# Patient Record
Sex: MALE | Race: WHITE | NOT HISPANIC OR LATINO | Employment: UNEMPLOYED | URBAN - METROPOLITAN AREA
[De-identification: names, ages, dates, MRNs, and addresses within clinical notes are randomized per-mention and may not be internally consistent; named-entity substitution may affect disease eponyms.]

---

## 2020-09-22 ENCOUNTER — OFFICE VISIT (OUTPATIENT)
Dept: FAMILY MEDICINE CLINIC | Facility: CLINIC | Age: 60
End: 2020-09-22

## 2020-09-22 DIAGNOSIS — Z12.11 SCREEN FOR COLON CANCER: Primary | ICD-10-CM

## 2020-09-22 DIAGNOSIS — E55.9 VITAMIN D DEFICIENCY: ICD-10-CM

## 2020-09-22 DIAGNOSIS — E78.2 HYPERLIPIDEMIA, MIXED: ICD-10-CM

## 2020-09-22 DIAGNOSIS — Z12.5 SCREENING PSA (PROSTATE SPECIFIC ANTIGEN): ICD-10-CM

## 2020-09-22 DIAGNOSIS — J30.9 CHRONIC ALLERGIC RHINITIS: ICD-10-CM

## 2020-09-22 DIAGNOSIS — Z72.0 TOBACCO ABUSE: ICD-10-CM

## 2020-09-22 PROCEDURE — 90732 PPSV23 VACC 2 YRS+ SUBQ/IM: CPT | Performed by: PHYSICIAN ASSISTANT

## 2020-09-22 PROCEDURE — 90471 IMMUNIZATION ADMIN: CPT | Performed by: PHYSICIAN ASSISTANT

## 2020-09-22 PROCEDURE — 90715 TDAP VACCINE 7 YRS/> IM: CPT | Performed by: PHYSICIAN ASSISTANT

## 2020-09-22 PROCEDURE — 99203 OFFICE O/P NEW LOW 30 MIN: CPT | Performed by: PHYSICIAN ASSISTANT

## 2020-09-22 PROCEDURE — 90472 IMMUNIZATION ADMIN EACH ADD: CPT | Performed by: PHYSICIAN ASSISTANT

## 2020-09-22 RX ORDER — FLUNISOLIDE 0.25 MG/ML
2 SOLUTION NASAL EVERY 12 HOURS
COMMUNITY

## 2020-09-22 NOTE — PROGRESS NOTES
Immunization  Immunization performed in RD by Hayes House MA. Patient tolerated the procedure well without complications.  09/22/20   Hayes House MA

## 2020-09-22 NOTE — PROGRESS NOTES
Immunization  Immunization performed in LD by Hayes House MA. Patient tolerated the procedure well without complications.  09/22/20   Hayes House MA

## 2020-09-22 NOTE — PROGRESS NOTES
Subjective   Isiah Saldana is a 60 y.o. male.     History of Present Illness   Isiah Saldana 60 y.o. male who presents today for a new patient appointment.    he has a history of There is no problem list on file for this patient.  .  he is here to establish care I reviewed the PFSH recorded today by my MA/LPN staff.   he   He has been feeling well.  He has had no medical care in the last several years.  He has been to the dentist and is edentulous.  Only wears a upper denture.   Had boil in groin a few years ago and did require incision and drainage and antibiotic therapy and did resolve  He has no other chronic conditions and he feels well every day.  Denies sleep apnea or observed apnea.  Due to smoking will update vaccines to include Tdap and Pneumovax 23.  I do also suggest flu shot in the next week or 2    need low dose CT lung; 36 pk yr hx---he did decide to get this.   Labs  Get weight down  Need colonoscopy screen'  Does get PND  Some OA knees  Need f/u on Vit D and lipids    occas pain left knee  occas trace pedal edema  The following portions of the patient's history were reviewed and updated as appropriate: allergies, current medications, past family history, past medical history, past social history, past surgical history and problem list.    Review of Systems   Constitutional: Negative for activity change, appetite change and unexpected weight change.   HENT: Negative for nosebleeds and trouble swallowing.    Eyes: Negative for pain and visual disturbance.   Respiratory: Negative for chest tightness, shortness of breath and wheezing.    Cardiovascular: Negative for chest pain and palpitations.   Gastrointestinal: Negative for abdominal pain and blood in stool.   Endocrine: Negative.    Genitourinary: Negative for difficulty urinating and hematuria.   Musculoskeletal: Positive for arthralgias. Negative for joint swelling.   Skin: Negative for color change and rash.   Allergic/Immunologic: Negative.     Neurological: Negative for syncope and speech difficulty.   Hematological: Negative for adenopathy.   Psychiatric/Behavioral: Negative for confusion.   All other systems reviewed and are negative.      Objective   Physical Exam  Vitals signs and nursing note reviewed.   Constitutional:       General: He is not in acute distress.     Appearance: He is well-developed. He is obese. He is not diaphoretic.   HENT:      Head: Normocephalic and atraumatic.      Comments: -fluid bilateral     Right Ear: Ear canal and external ear normal.      Left Ear: Ear canal and external ear normal.      Nose: Nose normal.      Mouth/Throat:      Comments: Upper denture, edentulous  Eyes:      General: No scleral icterus.        Right eye: No discharge.         Left eye: No discharge.      Conjunctiva/sclera: Conjunctivae normal.      Pupils: Pupils are equal, round, and reactive to light.   Neck:      Musculoskeletal: Normal range of motion and neck supple.      Thyroid: No thyromegaly.      Vascular: Carotid bruit present.      Trachea: No tracheal deviation.   Cardiovascular:      Rate and Rhythm: Normal rate and regular rhythm.      Pulses: Normal pulses.      Heart sounds: Normal heart sounds. No murmur. No gallop.    Pulmonary:      Effort: Pulmonary effort is normal. No respiratory distress.      Breath sounds: Normal breath sounds. No wheezing or rales.   Abdominal:      General: Bowel sounds are normal.      Palpations: There is no mass.      Tenderness: There is no abdominal tenderness.   Musculoskeletal: Normal range of motion.         General: No deformity.      Right lower leg: Edema present.      Left lower leg: Edema present.      Comments: Trace pedal edema = bilat   Lymphadenopathy:      Cervical: No cervical adenopathy.   Skin:     General: Skin is warm.      Capillary Refill: Capillary refill takes less than 2 seconds.      Coloration: Skin is not pale.      Findings: No bruising, erythema or rash.      Comments:  Calluses on knees   Neurological:      General: No focal deficit present.      Mental Status: He is alert and oriented to person, place, and time.      Motor: No abnormal muscle tone.      Coordination: Coordination normal.   Psychiatric:         Mood and Affect: Mood normal.         Behavior: Behavior normal.         Thought Content: Thought content normal.         Judgment: Judgment normal.         Assessment/Plan   Isiah was seen today for establish care.    Diagnoses and all orders for this visit:    Screen for colon cancer  -     Comprehensive metabolic panel  -     Lipid panel  -     CBC and Differential  -     TSH  -     T3, Free  -     T4, Free  -     Vitamin D 25 Hydroxy  -     Vitamin B12  -     Folate  -     PSA Screen  -     Urinalysis With Microscopic - Urine, Clean Catch  -     Ambulatory Referral to Gastroenterology    Tobacco abuse  -     Comprehensive metabolic panel  -     Lipid panel  -     CBC and Differential  -     TSH  -     T3, Free  -     T4, Free  -     Vitamin D 25 Hydroxy  -     Vitamin B12  -     Folate  -     PSA Screen  -     Urinalysis With Microscopic - Urine, Clean Catch    Screening PSA (prostate specific antigen)  -     Comprehensive metabolic panel  -     Lipid panel  -     CBC and Differential  -     TSH  -     T3, Free  -     T4, Free  -     Vitamin D 25 Hydroxy  -     Vitamin B12  -     Folate  -     PSA Screen  -     Urinalysis With Microscopic - Urine, Clean Catch    Chronic allergic rhinitis  -     Comprehensive metabolic panel  -     Lipid panel  -     CBC and Differential  -     TSH  -     T3, Free  -     T4, Free  -     Vitamin D 25 Hydroxy  -     Vitamin B12  -     Folate  -     PSA Screen  -     Urinalysis With Microscopic - Urine, Clean Catch    Vitamin D deficiency  -     Comprehensive metabolic panel  -     Lipid panel  -     CBC and Differential  -     TSH  -     T3, Free  -     T4, Free  -     Vitamin D 25 Hydroxy  -     Vitamin B12  -     Folate  -     PSA  Screen  -     Urinalysis With Microscopic - Urine, Clean Catch    Hyperlipidemia, mixed  -     Comprehensive metabolic panel  -     Lipid panel  -     CBC and Differential  -     TSH  -     T3, Free  -     T4, Free  -     Vitamin D 25 Hydroxy  -     Vitamin B12  -     Folate  -     PSA Screen  -     Urinalysis With Microscopic - Urine, Clean Catch    Other orders  -     Pneumococcal Polysaccharide Vaccine 23-Valent Greater Than or Equal To 3yo Subcutaneous / IM  -     Tdap Vaccine Greater Than or Equal To 6yo IM      Low-dose CT for lung cancer screening due to 36-pack-year history  I will update Tdap, pneumovax 23; suggest flu shot next few weeks  Get labs  Stop smoking  Colon cancer screen

## 2020-09-29 ENCOUNTER — HOSPITAL ENCOUNTER (OUTPATIENT)
Dept: PET IMAGING | Facility: HOSPITAL | Age: 60
Discharge: HOME | End: 2020-09-29
Admitting: PHYSICIAN ASSISTANT

## 2020-09-29 DIAGNOSIS — Z72.0 TOBACCO ABUSE: ICD-10-CM

## 2020-09-29 PROCEDURE — G0297 LDCT FOR LUNG CA SCREEN: HCPCS

## 2020-09-30 LAB
25(OH)D3+25(OH)D2 SERPL-MCNC: 26.7 NG/ML (ref 30–100)
ALBUMIN SERPL-MCNC: 4.3 G/DL (ref 3.5–5.2)
ALBUMIN/GLOB SERPL: 1.7 G/DL
ALP SERPL-CCNC: 127 U/L (ref 39–117)
ALT SERPL-CCNC: 20 U/L (ref 1–41)
APPEARANCE UR: CLEAR
AST SERPL-CCNC: 19 U/L (ref 1–40)
BACTERIA #/AREA URNS HPF: NORMAL /HPF
BASOPHILS # BLD AUTO: 0.05 10*3/MM3 (ref 0–0.2)
BASOPHILS NFR BLD AUTO: 0.5 % (ref 0–1.5)
BILIRUB SERPL-MCNC: 0.3 MG/DL (ref 0–1.2)
BILIRUB UR QL STRIP: NEGATIVE
BUN SERPL-MCNC: 11 MG/DL (ref 6–20)
BUN/CREAT SERPL: 12.9 (ref 7–25)
CALCIUM SERPL-MCNC: 9.4 MG/DL (ref 8.6–10.5)
CASTS URNS MICRO: NORMAL
CHLORIDE SERPL-SCNC: 100 MMOL/L (ref 98–107)
CHOLEST SERPL-MCNC: 178 MG/DL (ref 0–200)
CO2 SERPL-SCNC: 28.2 MMOL/L (ref 22–29)
COLOR UR: YELLOW
CREAT SERPL-MCNC: 0.85 MG/DL (ref 0.76–1.27)
EOSINOPHIL # BLD AUTO: 0.2 10*3/MM3 (ref 0–0.4)
EOSINOPHIL NFR BLD AUTO: 2.1 % (ref 0.3–6.2)
EPI CELLS #/AREA URNS HPF: NORMAL /HPF
ERYTHROCYTE [DISTWIDTH] IN BLOOD BY AUTOMATED COUNT: 12.5 % (ref 12.3–15.4)
FOLATE SERPL-MCNC: 7.28 NG/ML (ref 4.78–24.2)
GLOBULIN SER CALC-MCNC: 2.5 GM/DL
GLUCOSE SERPL-MCNC: 154 MG/DL (ref 65–99)
GLUCOSE UR QL: NEGATIVE
HCT VFR BLD AUTO: 45 % (ref 37.5–51)
HDLC SERPL-MCNC: 32 MG/DL (ref 40–60)
HGB BLD-MCNC: 15.6 G/DL (ref 13–17.7)
HGB UR QL STRIP: NEGATIVE
IMM GRANULOCYTES # BLD AUTO: 0.04 10*3/MM3 (ref 0–0.05)
IMM GRANULOCYTES NFR BLD AUTO: 0.4 % (ref 0–0.5)
KETONES UR QL STRIP: NEGATIVE
LDLC SERPL CALC-MCNC: 117 MG/DL (ref 0–100)
LEUKOCYTE ESTERASE UR QL STRIP: NEGATIVE
LYMPHOCYTES # BLD AUTO: 3.97 10*3/MM3 (ref 0.7–3.1)
LYMPHOCYTES NFR BLD AUTO: 41.1 % (ref 19.6–45.3)
MCH RBC QN AUTO: 30.8 PG (ref 26.6–33)
MCHC RBC AUTO-ENTMCNC: 34.7 G/DL (ref 31.5–35.7)
MCV RBC AUTO: 88.9 FL (ref 79–97)
MONOCYTES # BLD AUTO: 0.56 10*3/MM3 (ref 0.1–0.9)
MONOCYTES NFR BLD AUTO: 5.8 % (ref 5–12)
NEUTROPHILS # BLD AUTO: 4.83 10*3/MM3 (ref 1.7–7)
NEUTROPHILS NFR BLD AUTO: 50.1 % (ref 42.7–76)
NITRITE UR QL STRIP: NEGATIVE
NRBC BLD AUTO-RTO: 0 /100 WBC (ref 0–0.2)
PH UR STRIP: 6.5 [PH] (ref 5–8)
PLATELET # BLD AUTO: 291 10*3/MM3 (ref 140–450)
POTASSIUM SERPL-SCNC: 4.6 MMOL/L (ref 3.5–5.2)
PROT SERPL-MCNC: 6.8 G/DL (ref 6–8.5)
PROT UR QL STRIP: NEGATIVE
PSA SERPL-MCNC: 0.35 NG/ML (ref 0–4)
RBC # BLD AUTO: 5.06 10*6/MM3 (ref 4.14–5.8)
RBC #/AREA URNS HPF: NORMAL /HPF
SODIUM SERPL-SCNC: 139 MMOL/L (ref 136–145)
SP GR UR: 1.02 (ref 1–1.03)
T3FREE SERPL-MCNC: 3.8 PG/ML (ref 2–4.4)
T4 FREE SERPL-MCNC: 0.93 NG/DL (ref 0.93–1.7)
TRIGL SERPL-MCNC: 144 MG/DL (ref 0–150)
TSH SERPL DL<=0.005 MIU/L-ACNC: 2.42 UIU/ML (ref 0.27–4.2)
UROBILINOGEN UR STRIP-MCNC: NORMAL MG/DL
VIT B12 SERPL-MCNC: 320 PG/ML (ref 211–946)
VLDLC SERPL CALC-MCNC: 28.8 MG/DL
WBC # BLD AUTO: 9.65 10*3/MM3 (ref 3.4–10.8)
WBC #/AREA URNS HPF: NORMAL /HPF

## 2020-10-01 LAB
HBA1C MFR BLD: 7.5 % (ref 4.8–5.6)
Lab: NORMAL
WRITTEN AUTHORIZATION: NORMAL

## 2020-10-02 ENCOUNTER — OFFICE VISIT (OUTPATIENT)
Dept: FAMILY MEDICINE CLINIC | Facility: CLINIC | Age: 60
End: 2020-10-02

## 2020-10-02 VITALS
DIASTOLIC BLOOD PRESSURE: 72 MMHG | TEMPERATURE: 97.5 F | HEART RATE: 100 BPM | BODY MASS INDEX: 39.82 KG/M2 | HEIGHT: 72 IN | OXYGEN SATURATION: 96 % | SYSTOLIC BLOOD PRESSURE: 126 MMHG | RESPIRATION RATE: 16 BRPM | WEIGHT: 294 LBS

## 2020-10-02 VITALS
HEART RATE: 90 BPM | TEMPERATURE: 97.8 F | WEIGHT: 296 LBS | DIASTOLIC BLOOD PRESSURE: 72 MMHG | HEIGHT: 72 IN | RESPIRATION RATE: 16 BRPM | OXYGEN SATURATION: 97 % | SYSTOLIC BLOOD PRESSURE: 128 MMHG | BODY MASS INDEX: 40.09 KG/M2

## 2020-10-02 DIAGNOSIS — B35.1 ONYCHOMYCOSIS: ICD-10-CM

## 2020-10-02 DIAGNOSIS — K57.90 DIVERTICULOSIS: ICD-10-CM

## 2020-10-02 DIAGNOSIS — E78.2 HYPERLIPIDEMIA, MIXED: ICD-10-CM

## 2020-10-02 DIAGNOSIS — K76.0 FATTY LIVER: ICD-10-CM

## 2020-10-02 DIAGNOSIS — R59.1 LYMPHADENOPATHY: ICD-10-CM

## 2020-10-02 DIAGNOSIS — I77.810 ASCENDING AORTA DILATATION (HCC): ICD-10-CM

## 2020-10-02 DIAGNOSIS — E53.8 LOW SERUM VITAMIN B12: ICD-10-CM

## 2020-10-02 DIAGNOSIS — R94.6 BORDERLINE ABNORMAL THYROID FUNCTION TEST: ICD-10-CM

## 2020-10-02 DIAGNOSIS — R91.1 LUNG NODULE SEEN ON IMAGING STUDY: ICD-10-CM

## 2020-10-02 DIAGNOSIS — E53.8 LOW FOLIC ACID: ICD-10-CM

## 2020-10-02 DIAGNOSIS — Z72.0 TOBACCO ABUSE: ICD-10-CM

## 2020-10-02 DIAGNOSIS — I25.84 CORONARY ARTERY CALCIFICATION: ICD-10-CM

## 2020-10-02 DIAGNOSIS — B35.3 TINEA PEDIS OF BOTH FEET: ICD-10-CM

## 2020-10-02 DIAGNOSIS — R91.8 ABNORMAL CT LUNG SCREENING: Primary | ICD-10-CM

## 2020-10-02 DIAGNOSIS — E55.9 VITAMIN D DEFICIENCY: ICD-10-CM

## 2020-10-02 DIAGNOSIS — Z23 NEED FOR INFLUENZA VACCINATION: ICD-10-CM

## 2020-10-02 DIAGNOSIS — I25.10 CORONARY ARTERY CALCIFICATION: ICD-10-CM

## 2020-10-02 PROCEDURE — 90686 IIV4 VACC NO PRSV 0.5 ML IM: CPT | Performed by: PHYSICIAN ASSISTANT

## 2020-10-02 PROCEDURE — 99214 OFFICE O/P EST MOD 30 MIN: CPT | Performed by: PHYSICIAN ASSISTANT

## 2020-10-02 PROCEDURE — 90471 IMMUNIZATION ADMIN: CPT | Performed by: PHYSICIAN ASSISTANT

## 2020-10-02 RX ORDER — CLOTRIMAZOLE 1 %
CREAM (GRAM) TOPICAL 2 TIMES DAILY
Qty: 113 G | Refills: 5 | Status: SHIPPED | OUTPATIENT
Start: 2020-10-02

## 2020-10-02 RX ORDER — MAGNESIUM 200 MG
TABLET ORAL
Qty: 90 EACH | Refills: 3 | Status: SHIPPED | OUTPATIENT
Start: 2020-10-02

## 2020-10-02 RX ORDER — FOLIC ACID 1 MG/1
1 TABLET ORAL DAILY
Qty: 90 TABLET | Refills: 1 | Status: SHIPPED | OUTPATIENT
Start: 2020-10-02 | End: 2022-04-06

## 2020-10-02 RX ORDER — ROSUVASTATIN CALCIUM 20 MG/1
20 TABLET, COATED ORAL DAILY
Qty: 30 TABLET | Refills: 11 | Status: SHIPPED | OUTPATIENT
Start: 2020-10-02 | End: 2020-11-19

## 2020-10-02 RX ORDER — METFORMIN HYDROCHLORIDE 500 MG/1
TABLET, EXTENDED RELEASE ORAL
Qty: 120 TABLET | Refills: 2 | Status: SHIPPED | OUTPATIENT
Start: 2020-10-02 | End: 2020-11-19 | Stop reason: SDUPTHER

## 2020-10-02 NOTE — PATIENT INSTRUCTIONS
Diabetes Mellitus and Skin Care  Diabetes (diabetes mellitus) can lead to health problems over time, including skin problems. People with diabetes have a higher risk for many types of skin complications. This is because having poorly controlled blood sugar (glucose) levels can:  · Damage nerves and blood vessels. This can result in decreased feeling in your legs and feet, which means you may not notice minor skin injuries that could lead to serious problems.  · Reduce blood flow (circulation), which makes wounds heal more slowly and increases your risk of infection.  · Cause areas of skin to become thick or discolored.  What are some common skin conditions that affect people with diabetes?  Diabetes often causes dry skin. It can also cause the skin on the feet to get thinner, break more easily, and heal more slowly. There are certain skin conditions that commonly affect people who have diabetes, such as:  · Bacterial skin infections, such as styes, boils, infected hair follicles, and infections of the skin around the nails.  · Fungal skin infections. These are most common in areas where skin rubs together, such as in the armpits or under the breasts.  · Open sores, especially on the feet.  · Tissue death (gangrene). This can happen on your feet if a serious infection does not heal properly. Gangrene can cause the need for a foot or leg to be surgically removed (amputated).  Diabetes can also cause the skin to change. You may develop:  · Dark, velvety markings on the skin that usually appear on the face, neck, armpits, inner thighs, and groin (acanthosis nigricans). This typically affects people of -American and American- descent.  · Red, raised, scar-like tissue that may itch, feel painful, or develop into a wound (necrobiosis lipoidica).  · Blisters on feet, toes, hands, or fingers.  · Thickened, wax-like areas of skin that usually occur on the hands, forehead, or toes (digital sclerosis).  · Brown or  red ring-shaped or half-ring-shaped patches of skin on the ears or fingers (disseminated granuloma).  · Pea-shaped yellow bumps that may be itchy and surrounded by a red ring (eruptive xanthomatosis). This usually affects the arms, feet, buttocks, and the top of the hands.  · Round, discolored patches of tan skin that do not hurt or itch (diabetic dermopathy). These may look like age spots.  What do I need to know about itchy skin?  It is common for people with diabetes to have itchy skin caused by dryness. Frequent high blood glucose levels can cause itchiness, and poor circulation and certain skin infections can make dry, itchy skin worse. If you have itchy skin that is red or covered in a rash, this could be a sign of an allergic reaction to a medicine.  If you have a rash or if your skin is very itchy, contact your health care provider. You may need help to manage your diabetes better, or you may need treatment for an infection.  How can I prevent skin breakdown?  When you have diabetes and you get a badly infected ulcer or sore that does not heal, your skin can break down, especially if you have poor circulation or are on bed rest. To prevent skin breakdown:  · Keep your skin clean and dry. Wash your skin often. Do not use hot water.  · Do not use any products that contain nicotine or tobacco, such as cigarettes and e-cigarettes. Smoking affects the body’s ability to heal. If you need help quitting, ask your health care provider.  · Check your skin every day for cuts, bruises, redness, blisters, or sores, especially on your feet. Tell your health care provider about any cuts, wounds, or sores you have, especially if they are healing slowly.  · If you are on bed rest, try to change positions often.  What else do I need to know about taking care of my skin?    · To relieve dry skin and itching:  ? Limit baths and showers to 5-10 minutes.  ? Bathe with lukewarm water instead of hot water.  ? Use mild soap and  gentle skin cleansers. Do not use soap that is perfumed or harsh or dries your skin.  ? Put on lotion as soon as you finish bathing.  · Make sure that your health care provider performs a visual foot exam at every medical visit.  · Schedule a foot exam with your health care provider once every year. This exam includes an inspection of the structure and skin of your feet.  · If you get a skin injury, such as a cut, blister, or sore, check the area every day for signs of infection. Check for:  ? More redness, swelling, or pain.  ? More fluid or blood.  ? Warmth.  ? Pus or a bad smell.  Contact a health care provider if:  · You develop a cut or sore, especially on your feet.  · You develop signs of infection after a skin injury.  · Your blood glucose level is higher than 240 mg/dL (13.3 mmol/L) for 2 days in a row.  · You have itchy skin that develops redness or a rash.  · You have discolored areas of skin.  · You have areas where your skin is changing, such as thickening or appearing shiny.  Summary  · Diabetes (diabetes mellitus) can lead to health problems over time, including skin problems.  · People with diabetes have a higher risk for many types of skin complications.  · Check your skin every day for cuts, bruises, redness, blisters, or sores, especially on your feet.  · Tell your health care provider about any cuts, wounds, or sores you have, especially if they are healing slowly.  This information is not intended to replace advice given to you by your health care provider. Make sure you discuss any questions you have with your health care provider.  Document Released: 05/30/2017 Document Revised: 07/12/2018 Document Reviewed: 05/30/2017  [employer] Patient Education © 2020 [employer]

## 2020-10-02 NOTE — PROGRESS NOTES
"Subjective   Isiah Saldana is a 60 y.o. male.     History of Present Illness    Since the last visit, he has overall felt fairly well.  He has New diagnosis of DMII and plan to start medication with diet and exercise. Went over need to have yearly DM eye exam and copy me on this, suggest diabetes educator and dietician.  I have reviewed lab goals, Hyperlipidemia and will start medication plan for treatment.  I will order follow up labs and Vitamin D deficiency and will update labs for continued management. /72 (BP Location: Right arm, Patient Position: Sitting, Cuff Size: Large Adult)   Pulse 90   Temp 97.8 °F (36.6 °C) (Temporal)   Resp 16   Ht 182.9 cm (72\")   Wt 134 kg (296 lb)   SpO2 97%   BMI 40.14 kg/m²   He just did labs and I have several concerns. New Dx DMII with A1C 7.5%  No prior PSA.  GF DMII;   Mom is thyroid  Lower range folic acid and B12 low---Vitamin B12 is in lower range.  Start over the counter B12 sublingual 1,000 mcg daily.  Labs show low Vitamin D levels.  I want you to add OTC Vitamin D 2,000 I.U. Daily.  Free T4 borderline hypothyroid at 0.93 and watching  Will also f/u on lymphocytes  LDL was 117  Alk phos 127  Renal labs normal  Results for orders placed or performed in visit on 09/22/20   Comprehensive metabolic panel    Specimen: Blood   Result Value Ref Range    Glucose 154 (H) 65 - 99 mg/dL    BUN 11 6 - 20 mg/dL    Creatinine 0.85 0.76 - 1.27 mg/dL    eGFR Non African Am 93 >60 mL/min/1.73    eGFR African Am 112 >60 mL/min/1.73    BUN/Creatinine Ratio 12.9 7.0 - 25.0    Sodium 139 136 - 145 mmol/L    Potassium 4.6 3.5 - 5.2 mmol/L    Chloride 100 98 - 107 mmol/L    Total CO2 28.2 22.0 - 29.0 mmol/L    Calcium 9.4 8.6 - 10.5 mg/dL    Total Protein 6.8 6.0 - 8.5 g/dL    Albumin 4.30 3.50 - 5.20 g/dL    Globulin 2.5 gm/dL    A/G Ratio 1.7 g/dL    Total Bilirubin 0.3 0.0 - 1.2 mg/dL    Alkaline Phosphatase 127 (H) 39 - 117 U/L    AST (SGOT) 19 1 - 40 U/L    ALT (SGPT) 20 1 " - 41 U/L   Lipid panel    Specimen: Blood   Result Value Ref Range    Total Cholesterol 178 0 - 200 mg/dL    Triglycerides 144 0 - 150 mg/dL    HDL Cholesterol 32 (L) 40 - 60 mg/dL    VLDL Cholesterol Geovanny 28.8 mg/dL    LDL Chol Calc (NIH) 117 (H) 0 - 100 mg/dL   TSH    Specimen: Blood   Result Value Ref Range    TSH 2.420 0.270 - 4.200 uIU/mL   T3, Free    Specimen: Blood   Result Value Ref Range    T3, Free 3.8 2.0 - 4.4 pg/mL   T4, Free    Specimen: Blood   Result Value Ref Range    Free T4 0.93 0.93 - 1.70 ng/dL   Vitamin D 25 Hydroxy    Specimen: Blood   Result Value Ref Range    25 Hydroxy, Vitamin D 26.7 (L) 30.0 - 100.0 ng/ml   Vitamin B12    Specimen: Blood   Result Value Ref Range    Vitamin B-12 320 211 - 946 pg/mL   Folate    Specimen: Blood   Result Value Ref Range    Folate 7.28 4.78 - 24.20 ng/mL   PSA Screen    Specimen: Blood   Result Value Ref Range    PSA 0.355 0.000 - 4.000 ng/mL   Microscopic Examination -   Result Value Ref Range    WBC, UA 0-2 /HPF    RBC, UA 0-2 /HPF    Epithelial Cells (non renal) 0-2 /HPF    Cast Type Comment     Bacteria, UA Comment None Seen /HPF   Hemoglobin A1c   Result Value Ref Range    Hemoglobin A1C 7.50 (H) 4.80 - 5.60 %   Please Note   Result Value Ref Range    Please note Comment    Written Authorization   Result Value Ref Range    Written Authorization Comment    CBC and Differential    Specimen: Blood   Result Value Ref Range    WBC 9.65 3.40 - 10.80 10*3/mm3    RBC 5.06 4.14 - 5.80 10*6/mm3    Hemoglobin 15.6 13.0 - 17.7 g/dL    Hematocrit 45.0 37.5 - 51.0 %    MCV 88.9 79.0 - 97.0 fL    MCH 30.8 26.6 - 33.0 pg    MCHC 34.7 31.5 - 35.7 g/dL    RDW 12.5 12.3 - 15.4 %    Platelets 291 140 - 450 10*3/mm3    Neutrophil Rel % 50.1 42.7 - 76.0 %    Lymphocyte Rel % 41.1 19.6 - 45.3 %    Monocyte Rel % 5.8 5.0 - 12.0 %    Eosinophil Rel % 2.1 0.3 - 6.2 %    Basophil Rel % 0.5 0.0 - 1.5 %    Neutrophils Absolute 4.83 1.70 - 7.00 10*3/mm3    Lymphocytes Absolute 3.97  (H) 0.70 - 3.10 10*3/mm3    Monocytes Absolute 0.56 0.10 - 0.90 10*3/mm3    Eosinophils Absolute 0.20 0.00 - 0.40 10*3/mm3    Basophils Absolute 0.05 0.00 - 0.20 10*3/mm3    Immature Granulocyte Rel % 0.4 0.0 - 0.5 %    Immature Grans Absolute 0.04 0.00 - 0.05 10*3/mm3    nRBC 0.0 0.0 - 0.2 /100 WBC   Urinalysis With Microscopic - Urine, Clean Catch    Specimen: Urine, Clean Catch   Result Value Ref Range    Specific Gravity, UA 1.020 1.005 - 1.030    pH, UA 6.5 5.0 - 8.0    Color, UA Yellow     Appearance, UA Clear Clear    Leukocytes, UA Negative Negative    Protein Negative Negative    Glucose, UA Negative Negative    Ketones Negative Negative    Blood, UA Negative Negative    Bilirubin, UA Negative Negative    Urobilinogen, UA Comment     Nitrite, UA Negative Negative     Has 36 pk yr hx tob---still smoking  Just updated vaccines  CT low dose chest done 9-29-20 and abnormal  ---note fatty liver, diverticulosis---also concern with mildly dilated ascending aorta and note of moderate coronary calcifications.  ---now has Dx DMII and smokes---need to see cardiologist for further work up.  Borderline enlarged subaortic node measures 1.1 cm in short axis  dimension. Enlarged left axillary node measures 1.3 cm in short axis  dimension. The ascending aorta is mildly dilated, measuring  approximately 3.8 cm. Moderate coronary artery calcification is present.     There is extensive, primarily peripheral ground glass opacification  throughout the bilateral lungs, most notably in the upper lobes. No  superimposed pulmonary consolidation, pleural effusion or pneumothorax  is present. A 0.6 cm pulmonary nodule is present within the left lower  lobe and pleural-based. Sub-6 mm pulmonary nodules are present within  the left upper and lower lobes.     There are no suspicious lytic or blastic osseous lesions  IMPRESSION:  1.  Subcentimeter pulmonary nodules are present measuring up to 0.6 cm.  Lung-RADS category 3S. Follow-up with  low-dose chest CT in 6 months is  recommended.  2.  Retrocaval soft tissue nodule is present and incompletely  visualized. Findings raise are most concerning for underlying  retroperitoneal adenopathy and further evaluation with CT abdomen and  pelvis with intravenous contrast is recommended to further characterize.  3.  Borderline enlarged left axillary and mediastinal nodes of  indeterminate clinical significance, particularly in the setting of the  soft tissue nodule incompletely visualized in the upper abdomen. In the  absence of known malignancy, continued attention on follow-up with chest  CT in 3 months is recommended to ensure stability. If CT abdomen and  pelvis raises concern for malignancy, these areas should be more closely  followed with chest CT in 6-8 weeks versus PET/CT.  4.  Hepatic steatosis  5.  Other findings as above.  The following portions of the patient's history were reviewed and updated as appropriate: allergies, current medications, past family history, past medical history, past social history, past surgical history and problem list.    Review of Systems   Constitutional: Negative for activity change, appetite change, fatigue and unexpected weight change.   HENT: Negative for nosebleeds and trouble swallowing.    Eyes: Negative for pain and visual disturbance.   Respiratory: Negative for chest tightness, shortness of breath and wheezing.    Cardiovascular: Negative for chest pain and palpitations.   Gastrointestinal: Negative for abdominal pain and blood in stool.   Endocrine: Negative.    Genitourinary: Negative for difficulty urinating and hematuria.   Musculoskeletal: Positive for arthralgias. Negative for joint swelling.   Skin: Negative for color change and rash.   Allergic/Immunologic: Negative.    Neurological: Negative for syncope and speech difficulty.   Hematological: Negative for adenopathy.   Psychiatric/Behavioral: Negative for agitation, confusion, dysphoric mood and sleep  disturbance. The patient is not nervous/anxious.    All other systems reviewed and are negative.      Objective   Physical Exam  Vitals signs and nursing note reviewed.   Constitutional:       General: He is not in acute distress.     Appearance: He is well-developed. He is obese. He is not ill-appearing or diaphoretic.   HENT:      Head: Normocephalic and atraumatic.      Right Ear: External ear normal.      Left Ear: External ear normal.   Eyes:      General: No scleral icterus.     Conjunctiva/sclera: Conjunctivae normal.      Pupils: Pupils are equal, round, and reactive to light.   Neck:      Musculoskeletal: Normal range of motion and neck supple.      Thyroid: No thyromegaly.      Vascular: No carotid bruit.   Cardiovascular:      Rate and Rhythm: Normal rate and regular rhythm.      Pulses: Normal pulses.           Dorsalis pedis pulses are 2+ on the right side and 2+ on the left side.        Posterior tibial pulses are 2+ on the right side and 2+ on the left side.      Heart sounds: Normal heart sounds. No murmur. No friction rub. No gallop.    Pulmonary:      Effort: Pulmonary effort is normal. No respiratory distress.      Breath sounds: Normal breath sounds. No stridor. No wheezing.   Musculoskeletal: Normal range of motion.         General: No tenderness.      Right lower leg: Edema present.      Left lower leg: Edema present.      Right foot: Normal range of motion. No deformity, bunion, Charcot foot, foot drop or prominent metatarsal heads.      Left foot: Normal range of motion. No deformity, bunion, Charcot foot, foot drop or prominent metatarsal heads.        Feet:       Comments: Almost 1+= pedal edema     Feet:      Right foot:      Protective Sensation: 10 sites tested. 10 sites sensed.      Skin integrity: Erythema and callus present.      Toenail Condition: Right toenails are abnormally thick. Fungal disease present.     Left foot:      Protective Sensation: 10 sites tested. 10 sites sensed.       Skin integrity: Erythema and callus present.      Toenail Condition: Left toenails are abnormally thick. Fungal disease present.  Skin:     General: Skin is warm.      Capillary Refill: Capillary refill takes less than 2 seconds.      Coloration: Skin is not jaundiced.      Findings: Rash present. No bruising.      Comments: Dry peeling erythematous rash plantar aspect both feet and maceration between toes and mild erythema consistent with tinea pedis   Neurological:      General: No focal deficit present.      Mental Status: He is alert and oriented to person, place, and time. Mental status is at baseline.      Coordination: Coordination normal.      Gait: Gait normal.      Comments: Decreased sensation tip of left great toe but is intact   Psychiatric:         Mood and Affect: Mood normal.         Behavior: Behavior normal.         Thought Content: Thought content normal.         Judgment: Judgment normal.         Assessment/Plan   Isiah was seen today for diabetes.    Diagnoses and all orders for this visit:    Abnormal CT lung screening  -     Cancel: Comprehensive metabolic panel; Future  -     Cancel: Lipid panel; Future  -     Cancel: CBC & Differential; Future  -     Cancel: Vitamin B12; Future  -     Cancel: Folate; Future  -     Cancel: Vitamin D 25 Hydroxy; Future  -     Cancel: T3, Free; Future  -     Cancel: T4, Free; Future  -     Cancel: TSH; Future  -     Cancel: Hemoglobin A1c; Future  -     Cancel: Nicotine Screen, Urine - Urine, Clean Catch; Future  -     CT Abdomen Pelvis With Contrast; Future  -     Ambulatory Referral to Cardiology  -     Ambulatory Referral to Diabetic Education  -     Ambulatory Referral to Nutrition Services  -     Comprehensive metabolic panel; Future  -     Lipid panel; Future  -     CBC and Differential; Future  -     TSH; Future  -     Hemoglobin A1c; Future  -     Microalbumin / Creatinine Urine Ratio - Urine, Clean Catch; Future  -     T4, Free; Future  -      T3, Free; Future  -     Vitamin D 25 Hydroxy; Future  -     Vitamin B12; Future  -     Folate; Future    Coronary artery calcification  -     Cancel: Comprehensive metabolic panel; Future  -     Cancel: Lipid panel; Future  -     Cancel: CBC & Differential; Future  -     Cancel: Vitamin B12; Future  -     Cancel: Folate; Future  -     Cancel: Vitamin D 25 Hydroxy; Future  -     Cancel: T3, Free; Future  -     Cancel: T4, Free; Future  -     Cancel: TSH; Future  -     Cancel: Hemoglobin A1c; Future  -     Cancel: Nicotine Screen, Urine - Urine, Clean Catch; Future  -     CT Abdomen Pelvis With Contrast; Future  -     Ambulatory Referral to Cardiology  -     Ambulatory Referral to Diabetic Education  -     Ambulatory Referral to Nutrition Services  -     Comprehensive metabolic panel; Future  -     Lipid panel; Future  -     CBC and Differential; Future  -     TSH; Future  -     Hemoglobin A1c; Future  -     Microalbumin / Creatinine Urine Ratio - Urine, Clean Catch; Future  -     T4, Free; Future  -     T3, Free; Future  -     Vitamin D 25 Hydroxy; Future  -     Vitamin B12; Future  -     Folate; Future    Tobacco abuse  -     Cancel: Comprehensive metabolic panel; Future  -     Cancel: Lipid panel; Future  -     Cancel: CBC & Differential; Future  -     Cancel: Vitamin B12; Future  -     Cancel: Folate; Future  -     Cancel: Vitamin D 25 Hydroxy; Future  -     Cancel: T3, Free; Future  -     Cancel: T4, Free; Future  -     Cancel: TSH; Future  -     Cancel: Hemoglobin A1c; Future  -     Cancel: Nicotine Screen, Urine - Urine, Clean Catch; Future  -     CT Abdomen Pelvis With Contrast; Future  -     Ambulatory Referral to Cardiology  -     Ambulatory Referral to Diabetic Education  -     Ambulatory Referral to Nutrition Services  -     Comprehensive metabolic panel; Future  -     Lipid panel; Future  -     CBC and Differential; Future  -     TSH; Future  -     Hemoglobin A1c; Future  -     Microalbumin / Creatinine  Urine Ratio - Urine, Clean Catch; Future  -     T4, Free; Future  -     T3, Free; Future  -     Vitamin D 25 Hydroxy; Future  -     Vitamin B12; Future  -     Folate; Future    Hyperlipidemia, mixed  -     Cancel: Comprehensive metabolic panel; Future  -     Cancel: Lipid panel; Future  -     Cancel: CBC & Differential; Future  -     Cancel: Vitamin B12; Future  -     Cancel: Folate; Future  -     Cancel: Vitamin D 25 Hydroxy; Future  -     Cancel: T3, Free; Future  -     Cancel: T4, Free; Future  -     Cancel: TSH; Future  -     Cancel: Hemoglobin A1c; Future  -     Cancel: Nicotine Screen, Urine - Urine, Clean Catch; Future  -     CT Abdomen Pelvis With Contrast; Future  -     Ambulatory Referral to Cardiology  -     Ambulatory Referral to Diabetic Education  -     Ambulatory Referral to Nutrition Services  -     Comprehensive metabolic panel; Future  -     Lipid panel; Future  -     CBC and Differential; Future  -     TSH; Future  -     Hemoglobin A1c; Future  -     Microalbumin / Creatinine Urine Ratio - Urine, Clean Catch; Future  -     T4, Free; Future  -     T3, Free; Future  -     Vitamin D 25 Hydroxy; Future  -     Vitamin B12; Future  -     Folate; Future    Low serum vitamin B12  -     Cancel: Comprehensive metabolic panel; Future  -     Cancel: Lipid panel; Future  -     Cancel: CBC & Differential; Future  -     Cancel: Vitamin B12; Future  -     Cancel: Folate; Future  -     Cancel: Vitamin D 25 Hydroxy; Future  -     Cancel: T3, Free; Future  -     Cancel: T4, Free; Future  -     Cancel: TSH; Future  -     Cancel: Hemoglobin A1c; Future  -     Cancel: Nicotine Screen, Urine - Urine, Clean Catch; Future  -     CT Abdomen Pelvis With Contrast; Future  -     Ambulatory Referral to Cardiology  -     Ambulatory Referral to Diabetic Education  -     Ambulatory Referral to Nutrition Services  -     Comprehensive metabolic panel; Future  -     Lipid panel; Future  -     CBC and Differential; Future  -     TSH;  Future  -     Hemoglobin A1c; Future  -     Microalbumin / Creatinine Urine Ratio - Urine, Clean Catch; Future  -     T4, Free; Future  -     T3, Free; Future  -     Vitamin D 25 Hydroxy; Future  -     Vitamin B12; Future  -     Folate; Future    Low folic acid  -     Cancel: Comprehensive metabolic panel; Future  -     Cancel: Lipid panel; Future  -     Cancel: CBC & Differential; Future  -     Cancel: Vitamin B12; Future  -     Cancel: Folate; Future  -     Cancel: Vitamin D 25 Hydroxy; Future  -     Cancel: T3, Free; Future  -     Cancel: T4, Free; Future  -     Cancel: TSH; Future  -     Cancel: Hemoglobin A1c; Future  -     Cancel: Nicotine Screen, Urine - Urine, Clean Catch; Future  -     CT Abdomen Pelvis With Contrast; Future  -     Ambulatory Referral to Cardiology  -     Ambulatory Referral to Diabetic Education  -     Ambulatory Referral to Nutrition Services  -     Comprehensive metabolic panel; Future  -     Lipid panel; Future  -     CBC and Differential; Future  -     TSH; Future  -     Hemoglobin A1c; Future  -     Microalbumin / Creatinine Urine Ratio - Urine, Clean Catch; Future  -     T4, Free; Future  -     T3, Free; Future  -     Vitamin D 25 Hydroxy; Future  -     Vitamin B12; Future  -     Folate; Future    Vitamin D deficiency  -     Cancel: Comprehensive metabolic panel; Future  -     Cancel: Lipid panel; Future  -     Cancel: CBC & Differential; Future  -     Cancel: Vitamin B12; Future  -     Cancel: Folate; Future  -     Cancel: Vitamin D 25 Hydroxy; Future  -     Cancel: T3, Free; Future  -     Cancel: T4, Free; Future  -     Cancel: TSH; Future  -     Cancel: Hemoglobin A1c; Future  -     Cancel: Nicotine Screen, Urine - Urine, Clean Catch; Future  -     CT Abdomen Pelvis With Contrast; Future  -     Ambulatory Referral to Cardiology  -     Ambulatory Referral to Diabetic Education  -     Ambulatory Referral to Nutrition Services  -     Comprehensive metabolic panel; Future  -     Lipid  panel; Future  -     CBC and Differential; Future  -     TSH; Future  -     Hemoglobin A1c; Future  -     Microalbumin / Creatinine Urine Ratio - Urine, Clean Catch; Future  -     T4, Free; Future  -     T3, Free; Future  -     Vitamin D 25 Hydroxy; Future  -     Vitamin B12; Future  -     Folate; Future    Lung nodule seen on imaging study  -     Cancel: Comprehensive metabolic panel; Future  -     Cancel: Lipid panel; Future  -     Cancel: CBC & Differential; Future  -     Cancel: Vitamin B12; Future  -     Cancel: Folate; Future  -     Cancel: Vitamin D 25 Hydroxy; Future  -     Cancel: T3, Free; Future  -     Cancel: T4, Free; Future  -     Cancel: TSH; Future  -     Cancel: Hemoglobin A1c; Future  -     Cancel: Nicotine Screen, Urine - Urine, Clean Catch; Future  -     CT Abdomen Pelvis With Contrast; Future  -     Ambulatory Referral to Cardiology  -     Ambulatory Referral to Diabetic Education  -     Ambulatory Referral to Nutrition Services  -     Comprehensive metabolic panel; Future  -     Lipid panel; Future  -     CBC and Differential; Future  -     TSH; Future  -     Hemoglobin A1c; Future  -     Microalbumin / Creatinine Urine Ratio - Urine, Clean Catch; Future  -     T4, Free; Future  -     T3, Free; Future  -     Vitamin D 25 Hydroxy; Future  -     Vitamin B12; Future  -     Folate; Future    Onychomycosis  -     Cancel: Comprehensive metabolic panel; Future  -     Cancel: Lipid panel; Future  -     Cancel: CBC & Differential; Future  -     Cancel: Vitamin B12; Future  -     Cancel: Folate; Future  -     Cancel: Vitamin D 25 Hydroxy; Future  -     Cancel: T3, Free; Future  -     Cancel: T4, Free; Future  -     Cancel: TSH; Future  -     Cancel: Hemoglobin A1c; Future  -     Cancel: Nicotine Screen, Urine - Urine, Clean Catch; Future  -     CT Abdomen Pelvis With Contrast; Future  -     Ambulatory Referral to Cardiology  -     Ambulatory Referral to Diabetic Education  -     Ambulatory Referral to  Nutrition Services  -     Comprehensive metabolic panel; Future  -     Lipid panel; Future  -     CBC and Differential; Future  -     TSH; Future  -     Hemoglobin A1c; Future  -     Microalbumin / Creatinine Urine Ratio - Urine, Clean Catch; Future  -     T4, Free; Future  -     T3, Free; Future  -     Vitamin D 25 Hydroxy; Future  -     Vitamin B12; Future  -     Folate; Future    Fatty liver  -     Cancel: Comprehensive metabolic panel; Future  -     Cancel: Lipid panel; Future  -     Cancel: CBC & Differential; Future  -     Cancel: Vitamin B12; Future  -     Cancel: Folate; Future  -     Cancel: Vitamin D 25 Hydroxy; Future  -     Cancel: T3, Free; Future  -     Cancel: T4, Free; Future  -     Cancel: TSH; Future  -     Cancel: Hemoglobin A1c; Future  -     Cancel: Nicotine Screen, Urine - Urine, Clean Catch; Future  -     CT Abdomen Pelvis With Contrast; Future  -     Ambulatory Referral to Cardiology  -     Ambulatory Referral to Diabetic Education  -     Ambulatory Referral to Nutrition Services  -     Comprehensive metabolic panel; Future  -     Lipid panel; Future  -     CBC and Differential; Future  -     TSH; Future  -     Hemoglobin A1c; Future  -     Microalbumin / Creatinine Urine Ratio - Urine, Clean Catch; Future  -     T4, Free; Future  -     T3, Free; Future  -     Vitamin D 25 Hydroxy; Future  -     Vitamin B12; Future  -     Folate; Future    Diverticulosis  -     Cancel: Comprehensive metabolic panel; Future  -     Cancel: Lipid panel; Future  -     Cancel: CBC & Differential; Future  -     Cancel: Vitamin B12; Future  -     Cancel: Folate; Future  -     Cancel: Vitamin D 25 Hydroxy; Future  -     Cancel: T3, Free; Future  -     Cancel: T4, Free; Future  -     Cancel: TSH; Future  -     Cancel: Hemoglobin A1c; Future  -     Cancel: Nicotine Screen, Urine - Urine, Clean Catch; Future  -     CT Abdomen Pelvis With Contrast; Future  -     Ambulatory Referral to Cardiology  -     Ambulatory  Referral to Diabetic Education  -     Ambulatory Referral to Nutrition Services  -     Comprehensive metabolic panel; Future  -     Lipid panel; Future  -     CBC and Differential; Future  -     TSH; Future  -     Hemoglobin A1c; Future  -     Microalbumin / Creatinine Urine Ratio - Urine, Clean Catch; Future  -     T4, Free; Future  -     T3, Free; Future  -     Vitamin D 25 Hydroxy; Future  -     Vitamin B12; Future  -     Folate; Future    Borderline abnormal thyroid function test  -     Cancel: Comprehensive metabolic panel; Future  -     Cancel: Lipid panel; Future  -     Cancel: CBC & Differential; Future  -     Cancel: Vitamin B12; Future  -     Cancel: Folate; Future  -     Cancel: Vitamin D 25 Hydroxy; Future  -     Cancel: T3, Free; Future  -     Cancel: T4, Free; Future  -     Cancel: TSH; Future  -     Cancel: Hemoglobin A1c; Future  -     Cancel: Nicotine Screen, Urine - Urine, Clean Catch; Future  -     CT Abdomen Pelvis With Contrast; Future  -     Ambulatory Referral to Cardiology  -     Ambulatory Referral to Diabetic Education  -     Ambulatory Referral to Nutrition Services  -     Comprehensive metabolic panel; Future  -     Lipid panel; Future  -     CBC and Differential; Future  -     TSH; Future  -     Hemoglobin A1c; Future  -     Microalbumin / Creatinine Urine Ratio - Urine, Clean Catch; Future  -     T4, Free; Future  -     T3, Free; Future  -     Vitamin D 25 Hydroxy; Future  -     Vitamin B12; Future  -     Folate; Future    Ascending aorta dilatation (CMS/HCC)  -     Comprehensive metabolic panel; Future  -     Lipid panel; Future  -     CBC and Differential; Future  -     TSH; Future  -     Hemoglobin A1c; Future  -     Microalbumin / Creatinine Urine Ratio - Urine, Clean Catch; Future  -     T4, Free; Future  -     T3, Free; Future  -     Vitamin D 25 Hydroxy; Future  -     Vitamin B12; Future  -     Folate; Future    Tinea pedis of both feet    Need for influenza vaccination  -      Fluarix/Fluzone/Afluria Quad>6 Months    Lymphadenopathy  Comments:  on CT scan    Other orders  -     rosuvastatin (Crestor) 20 MG tablet; Take 1 tablet by mouth Daily. For cholesterol with Co Q 10  -     metFORMIN ER (GLUCOPHAGE-XR) 500 MG 24 hr tablet; Start with 1 p.o. daily and increase by 1 tab at a time to maximum of 4 p.o. daily with food for DMII  -     Cyanocobalamin (Vitamin B-12) 1000 MCG sublingual tablet; One SL daily  -     Cholecalciferol 50 MCG (2000 UT) capsule; One PO daily  -     folic acid (FOLVITE) 1 MG tablet; Take 1 tablet by mouth Daily.  -     clotrimazole (Clotrimazole Anti-Fungal) 1 % cream; Apply  topically to the appropriate area as directed 2 (Two) Times a Day.    Plan, Isiah Saldana, was seen today.  he was seen for DMII and will start medication and plan, Hyperlipidemia with new medication plan and Vitamin D deficiency and will update labs .  See cardiologist---concern about coronary calcifications and a sending aorta dilation  CT abd/pelvis with contrast  Pending CT will f/u on chest CT  Stop smoking  Watch thyroid labs  Labs 3 mos  AF cream feet   Start B12, folate, D  Start Metformin and statin  Fu one mo

## 2020-10-06 ENCOUNTER — OFFICE VISIT (OUTPATIENT)
Dept: CARDIOLOGY | Facility: CLINIC | Age: 60
End: 2020-10-06

## 2020-10-06 VITALS
SYSTOLIC BLOOD PRESSURE: 130 MMHG | HEIGHT: 72 IN | WEIGHT: 290.4 LBS | BODY MASS INDEX: 39.33 KG/M2 | DIASTOLIC BLOOD PRESSURE: 90 MMHG | HEART RATE: 82 BPM

## 2020-10-06 DIAGNOSIS — I77.810 ASCENDING AORTA DILATATION (HCC): Primary | ICD-10-CM

## 2020-10-06 DIAGNOSIS — I25.10 CORONARY ARTERY CALCIFICATION: ICD-10-CM

## 2020-10-06 DIAGNOSIS — I25.84 CORONARY ARTERY CALCIFICATION: ICD-10-CM

## 2020-10-06 PROCEDURE — 99244 OFF/OP CNSLTJ NEW/EST MOD 40: CPT | Performed by: INTERNAL MEDICINE

## 2020-10-07 ENCOUNTER — HOSPITAL ENCOUNTER (OUTPATIENT)
Dept: CT IMAGING | Facility: HOSPITAL | Age: 60
Discharge: HOME | End: 2020-10-07
Admitting: PHYSICIAN ASSISTANT

## 2020-10-07 ENCOUNTER — APPOINTMENT (OUTPATIENT)
Dept: DIABETES SERVICES | Facility: HOSPITAL | Age: 60
End: 2020-10-07

## 2020-10-07 DIAGNOSIS — I25.84 CORONARY ARTERY CALCIFICATION: ICD-10-CM

## 2020-10-07 DIAGNOSIS — B35.1 ONYCHOMYCOSIS: ICD-10-CM

## 2020-10-07 DIAGNOSIS — E53.8 LOW FOLIC ACID: ICD-10-CM

## 2020-10-07 DIAGNOSIS — E55.9 VITAMIN D DEFICIENCY: ICD-10-CM

## 2020-10-07 DIAGNOSIS — Z72.0 TOBACCO ABUSE: ICD-10-CM

## 2020-10-07 DIAGNOSIS — R94.6 BORDERLINE ABNORMAL THYROID FUNCTION TEST: ICD-10-CM

## 2020-10-07 DIAGNOSIS — R91.8 ABNORMAL CT LUNG SCREENING: ICD-10-CM

## 2020-10-07 DIAGNOSIS — K76.0 FATTY LIVER: ICD-10-CM

## 2020-10-07 DIAGNOSIS — K57.90 DIVERTICULOSIS: ICD-10-CM

## 2020-10-07 DIAGNOSIS — I25.10 CORONARY ARTERY CALCIFICATION: ICD-10-CM

## 2020-10-07 DIAGNOSIS — E53.8 LOW SERUM VITAMIN B12: ICD-10-CM

## 2020-10-07 DIAGNOSIS — E78.2 HYPERLIPIDEMIA, MIXED: ICD-10-CM

## 2020-10-07 DIAGNOSIS — R91.1 LUNG NODULE SEEN ON IMAGING STUDY: ICD-10-CM

## 2020-10-07 PROCEDURE — 93000 ELECTROCARDIOGRAM COMPLETE: CPT | Performed by: INTERNAL MEDICINE

## 2020-10-07 PROCEDURE — 74177 CT ABD & PELVIS W/CONTRAST: CPT

## 2020-10-07 PROCEDURE — 82565 ASSAY OF CREATININE: CPT

## 2020-10-07 PROCEDURE — 25010000002 IOPAMIDOL 61 % SOLUTION: Performed by: PHYSICIAN ASSISTANT

## 2020-10-07 RX ADMIN — IOPAMIDOL 90 ML: 612 INJECTION, SOLUTION INTRAVENOUS at 14:46

## 2020-10-07 NOTE — PROGRESS NOTES
Subjective:     Encounter Date:10/06/2020    Dear Francy thank you for referring this patient for evaluation of an abnormal CT scan with coronary artery calcifications and a dilated aorta.    Patient ID: Isiah Saldana is a 60 y.o. male.    Chief Complaint: Coronary artery calcifications and a dilated aorta  History of Present Illness    60-year-old gentleman who presents today for evaluation.  Patient underwent a CT scan and was found to have a dilatation of the aortic root.  By CT scan it measured 3.8 cm.  He was also found to have moderate calcification of his coronary arteries.  Patient history of diabetes hyperlipidemia.  Patient denies any types of cardiac symptoms.  He denies shortness of breath palpitations lightheadedness chest pain or fatigue.    Review of Systems   All other systems reviewed and are negative.        ECG 12 Lead    Date/Time: 10/7/2020 4:14 PM  Performed by: Pelon Bustamante MD  Authorized by: Pelon Bustamante MD   Previous ECG: no previous ECG available  Rhythm: sinus rhythm    Clinical impression: normal ECG               Objective:     Vitals signs reviewed.   Constitutional:       Appearance: Well-developed.   Eyes:      Conjunctiva/sclera: Conjunctivae normal.   HENT:      Head: Normocephalic.   Neck:      Musculoskeletal: Normal range of motion.   Pulmonary:      Breath sounds: Normal breath sounds.   Cardiovascular:      Normal rate. Regular rhythm.   Edema:     Peripheral edema absent.   Abdominal:      General: Bowel sounds are normal.      Palpations: Abdomen is soft.   Musculoskeletal: Normal range of motion.   Skin:     General: Skin is warm and dry.   Neurological:      Mental Status: Alert and oriented to person, place, and time.   Psychiatric:         Behavior: Behavior normal.         Lab Review:       Assessment:          Diagnosis Plan   1. Ascending aorta dilatation (CMS/HCC)  Treadmill Stress Test    Adult Transthoracic Echo Complete W/ Cont if Necessary Per  Protocol   2. Coronary artery calcification            Plan:         1.  Dilated aortic root.  Patient CT scan showed a root of 3.8 cm.  I am used to using up to 4 cm and I do not think the root is significantly dilated.  I am we will do an echocardiogram so we can assess it by echo.  If we confirm his root is not dilated by echo and I do not think a further work-up needs to be done.  2.  Calcification of the coronary arteries.  His ECG is unremarkable and he is asymptomatic.  In light of that I did an ordinary treadmill to make sure he is not have any type of ST or T wave changes.  He does have diabetes and as you well know they do not always present with chest discomforts.  3.  If these are unremarkable I would see him back in a year or so go from there.

## 2020-10-08 ENCOUNTER — PATIENT MESSAGE (OUTPATIENT)
Dept: FAMILY MEDICINE CLINIC | Facility: CLINIC | Age: 60
End: 2020-10-08

## 2020-10-08 LAB — CREAT BLDA-MCNC: 0.9 MG/DL (ref 0.6–1.3)

## 2020-10-09 DIAGNOSIS — Z83.71 FAMILY HISTORY OF POLYPS IN THE COLON: Primary | ICD-10-CM

## 2020-10-09 RX ORDER — SODIUM CHLORIDE, SODIUM LACTATE, POTASSIUM CHLORIDE, CALCIUM CHLORIDE 600; 310; 30; 20 MG/100ML; MG/100ML; MG/100ML; MG/100ML
30 INJECTION, SOLUTION INTRAVENOUS CONTINUOUS
Status: CANCELLED | OUTPATIENT
Start: 2020-11-18

## 2020-10-22 PROBLEM — Z83.71 FAMILY HISTORY OF POLYPS IN THE COLON: Status: ACTIVE | Noted: 2020-10-22

## 2020-11-02 ENCOUNTER — HOSPITAL ENCOUNTER (OUTPATIENT)
Dept: CARDIOLOGY | Facility: HOSPITAL | Age: 60
Discharge: HOME | End: 2020-11-02

## 2020-11-02 ENCOUNTER — TRANSCRIBE ORDERS (OUTPATIENT)
Dept: SLEEP MEDICINE | Facility: HOSPITAL | Age: 60
End: 2020-11-02

## 2020-11-02 VITALS
OXYGEN SATURATION: 96 % | HEIGHT: 72 IN | DIASTOLIC BLOOD PRESSURE: 84 MMHG | SYSTOLIC BLOOD PRESSURE: 119 MMHG | HEART RATE: 92 BPM | WEIGHT: 290 LBS | BODY MASS INDEX: 39.28 KG/M2

## 2020-11-02 DIAGNOSIS — I77.810 ASCENDING AORTA DILATATION (HCC): ICD-10-CM

## 2020-11-02 DIAGNOSIS — Z01.818 OTHER SPECIFIED PRE-OPERATIVE EXAMINATION: Primary | ICD-10-CM

## 2020-11-02 LAB
BH CV STRESS BP STAGE 1: NORMAL
BH CV STRESS BP STAGE 2: NORMAL
BH CV STRESS DURATION MIN STAGE 1: 3
BH CV STRESS DURATION MIN STAGE 2: 1
BH CV STRESS DURATION SEC STAGE 1: 0
BH CV STRESS DURATION SEC STAGE 2: 30
BH CV STRESS GRADE STAGE 1: 10
BH CV STRESS GRADE STAGE 2: 12
BH CV STRESS HR STAGE 1: 121
BH CV STRESS HR STAGE 2: 141
BH CV STRESS METS STAGE 1: 5
BH CV STRESS METS STAGE 2: 7.5
BH CV STRESS PROTOCOL 1: NORMAL
BH CV STRESS RECOVERY BP: NORMAL MMHG
BH CV STRESS RECOVERY HR: 91 BPM
BH CV STRESS SPEED STAGE 1: 1.7
BH CV STRESS SPEED STAGE 2: 2.5
BH CV STRESS STAGE 1: 1
BH CV STRESS STAGE 2: 2
MAXIMAL PREDICTED HEART RATE: 162 BPM
PERCENT MAX PREDICTED HR: 87.04 %
STRESS BASELINE BP: NORMAL MMHG
STRESS BASELINE HR: 77 BPM
STRESS PERCENT HR: 102 %
STRESS POST ESTIMATED WORKLOAD: 6 METS
STRESS POST EXERCISE DUR MIN: 4 MIN
STRESS POST EXERCISE DUR SEC: 30 SEC
STRESS POST PEAK BP: NORMAL MMHG
STRESS POST PEAK HR: 141 BPM
STRESS TARGET HR: 138 BPM

## 2020-11-02 PROCEDURE — 93306 TTE W/DOPPLER COMPLETE: CPT | Performed by: INTERNAL MEDICINE

## 2020-11-02 PROCEDURE — 93017 CV STRESS TEST TRACING ONLY: CPT

## 2020-11-02 PROCEDURE — 93018 CV STRESS TEST I&R ONLY: CPT | Performed by: INTERNAL MEDICINE

## 2020-11-02 PROCEDURE — 93306 TTE W/DOPPLER COMPLETE: CPT

## 2020-11-02 PROCEDURE — 25010000002 PERFLUTREN (DEFINITY) 8.476 MG IN SODIUM CHLORIDE (PF) 0.9 % 10 ML INJECTION: Performed by: INTERNAL MEDICINE

## 2020-11-02 PROCEDURE — 93016 CV STRESS TEST SUPVJ ONLY: CPT | Performed by: INTERNAL MEDICINE

## 2020-11-02 RX ADMIN — PERFLUTREN 1.5 ML: 6.52 INJECTION, SUSPENSION INTRAVENOUS at 08:59

## 2020-11-03 LAB
AORTIC ARCH: 3.6 CM
ASCENDING AORTA: 3.4 CM
BH CV ECHO MEAS - ACS: 2.4 CM
BH CV ECHO MEAS - AO MAX PG (FULL): 3 MMHG
BH CV ECHO MEAS - AO MAX PG: 5.6 MMHG
BH CV ECHO MEAS - AO MEAN PG (FULL): 2 MMHG
BH CV ECHO MEAS - AO MEAN PG: 3 MMHG
BH CV ECHO MEAS - AO ROOT AREA (BSA CORRECTED): 1.5
BH CV ECHO MEAS - AO ROOT AREA: 11.3 CM^2
BH CV ECHO MEAS - AO ROOT DIAM: 3.8 CM
BH CV ECHO MEAS - AO V2 MAX: 118 CM/SEC
BH CV ECHO MEAS - AO V2 MEAN: 81.5 CM/SEC
BH CV ECHO MEAS - AO V2 VTI: 21.5 CM
BH CV ECHO MEAS - ASC AORTA: 3.4 CM
BH CV ECHO MEAS - AVA(I,A): 2.5 CM^2
BH CV ECHO MEAS - AVA(I,D): 2.5 CM^2
BH CV ECHO MEAS - AVA(V,A): 2.6 CM^2
BH CV ECHO MEAS - AVA(V,D): 2.6 CM^2
BH CV ECHO MEAS - BSA(HAYCOCK): 2.6 M^2
BH CV ECHO MEAS - BSA: 2.5 M^2
BH CV ECHO MEAS - BZI_BMI: 38.5 KILOGRAMS/M^2
BH CV ECHO MEAS - BZI_METRIC_HEIGHT: 182.9 CM
BH CV ECHO MEAS - BZI_METRIC_WEIGHT: 128.8 KG
BH CV ECHO MEAS - EDV(MOD-SP2): 118 ML
BH CV ECHO MEAS - EDV(MOD-SP4): 93 ML
BH CV ECHO MEAS - EDV(TEICH): 107.5 ML
BH CV ECHO MEAS - EF(CUBED): 70.4 %
BH CV ECHO MEAS - EF(MOD-BP): 61 %
BH CV ECHO MEAS - EF(MOD-SP2): 65.3 %
BH CV ECHO MEAS - EF(MOD-SP4): 52.7 %
BH CV ECHO MEAS - EF(TEICH): 61.9 %
BH CV ECHO MEAS - ESV(MOD-SP2): 41 ML
BH CV ECHO MEAS - ESV(MOD-SP4): 44 ML
BH CV ECHO MEAS - ESV(TEICH): 41 ML
BH CV ECHO MEAS - FS: 33.3 %
BH CV ECHO MEAS - IVS/LVPW: 0.92
BH CV ECHO MEAS - IVSD: 1.1 CM
BH CV ECHO MEAS - LAT PEAK E' VEL: 7.1 CM/SEC
BH CV ECHO MEAS - LV DIASTOLIC VOL/BSA (35-75): 37.6 ML/M^2
BH CV ECHO MEAS - LV MASS(C)D: 206.4 GRAMS
BH CV ECHO MEAS - LV MASS(C)DI: 83.5 GRAMS/M^2
BH CV ECHO MEAS - LV MAX PG: 2.6 MMHG
BH CV ECHO MEAS - LV MEAN PG: 1 MMHG
BH CV ECHO MEAS - LV SYSTOLIC VOL/BSA (12-30): 17.8 ML/M^2
BH CV ECHO MEAS - LV V1 MAX: 80.9 CM/SEC
BH CV ECHO MEAS - LV V1 MEAN: 51 CM/SEC
BH CV ECHO MEAS - LV V1 VTI: 14.4 CM
BH CV ECHO MEAS - LVIDD: 4.8 CM
BH CV ECHO MEAS - LVIDS: 3.2 CM
BH CV ECHO MEAS - LVLD AP2: 8.2 CM
BH CV ECHO MEAS - LVLD AP4: 7.6 CM
BH CV ECHO MEAS - LVLS AP2: 7.1 CM
BH CV ECHO MEAS - LVLS AP4: 7 CM
BH CV ECHO MEAS - LVOT AREA (M): 3.8 CM^2
BH CV ECHO MEAS - LVOT AREA: 3.8 CM^2
BH CV ECHO MEAS - LVOT DIAM: 2.2 CM
BH CV ECHO MEAS - LVPWD: 1.2 CM
BH CV ECHO MEAS - MED PEAK E' VEL: 6.4 CM/SEC
BH CV ECHO MEAS - MV A DUR: 0.09 SEC
BH CV ECHO MEAS - MV A MAX VEL: 69.4 CM/SEC
BH CV ECHO MEAS - MV DEC SLOPE: 372 CM/SEC^2
BH CV ECHO MEAS - MV DEC TIME: 0.16 SEC
BH CV ECHO MEAS - MV E MAX VEL: 51.9 CM/SEC
BH CV ECHO MEAS - MV E/A: 0.75
BH CV ECHO MEAS - MV MAX PG: 3 MMHG
BH CV ECHO MEAS - MV MEAN PG: 2 MMHG
BH CV ECHO MEAS - MV P1/2T MAX VEL: 75.6 CM/SEC
BH CV ECHO MEAS - MV P1/2T: 59.5 MSEC
BH CV ECHO MEAS - MV V2 MAX: 86.2 CM/SEC
BH CV ECHO MEAS - MV V2 MEAN: 65 CM/SEC
BH CV ECHO MEAS - MV V2 VTI: 23 CM
BH CV ECHO MEAS - MVA P1/2T LCG: 2.9 CM^2
BH CV ECHO MEAS - MVA(P1/2T): 3.7 CM^2
BH CV ECHO MEAS - MVA(VTI): 2.4 CM^2
BH CV ECHO MEAS - PA ACC TIME: 0.06 SEC
BH CV ECHO MEAS - PA MAX PG (FULL): 1.6 MMHG
BH CV ECHO MEAS - PA MAX PG: 2.4 MMHG
BH CV ECHO MEAS - PA PR(ACCEL): 51.6 MMHG
BH CV ECHO MEAS - PA V2 MAX: 77.4 CM/SEC
BH CV ECHO MEAS - PULM A REVS DUR: 0.08 SEC
BH CV ECHO MEAS - PULM A REVS VEL: 36.7 CM/SEC
BH CV ECHO MEAS - PULM DIAS VEL: 34.4 CM/SEC
BH CV ECHO MEAS - PULM S/D: 1.4
BH CV ECHO MEAS - PULM SYS VEL: 47.7 CM/SEC
BH CV ECHO MEAS - PVA(V,A): 2 CM^2
BH CV ECHO MEAS - PVA(V,D): 2 CM^2
BH CV ECHO MEAS - QP/QS: 0.62
BH CV ECHO MEAS - RV MAX PG: 0.79 MMHG
BH CV ECHO MEAS - RV MEAN PG: 0 MMHG
BH CV ECHO MEAS - RV V1 MAX: 44.4 CM/SEC
BH CV ECHO MEAS - RV V1 MEAN: 31.2 CM/SEC
BH CV ECHO MEAS - RV V1 VTI: 9.7 CM
BH CV ECHO MEAS - RVOT AREA: 3.5 CM^2
BH CV ECHO MEAS - RVOT DIAM: 2.1 CM
BH CV ECHO MEAS - SI(AO): 98.6 ML/M^2
BH CV ECHO MEAS - SI(CUBED): 31.5 ML/M^2
BH CV ECHO MEAS - SI(LVOT): 22.1 ML/M^2
BH CV ECHO MEAS - SI(MOD-SP2): 31.1 ML/M^2
BH CV ECHO MEAS - SI(MOD-SP4): 19.8 ML/M^2
BH CV ECHO MEAS - SI(TEICH): 26.9 ML/M^2
BH CV ECHO MEAS - SUP REN AO DIAM: 2.4 CM
BH CV ECHO MEAS - SV(AO): 243.8 ML
BH CV ECHO MEAS - SV(CUBED): 77.8 ML
BH CV ECHO MEAS - SV(LVOT): 54.7 ML
BH CV ECHO MEAS - SV(MOD-SP2): 77 ML
BH CV ECHO MEAS - SV(MOD-SP4): 49 ML
BH CV ECHO MEAS - SV(RVOT): 33.7 ML
BH CV ECHO MEAS - SV(TEICH): 66.6 ML
BH CV ECHO MEAS - TAPSE (>1.6): 1.8 CM
BH CV ECHO MEASUREMENTS AVERAGE E/E' RATIO: 7.69
BH CV VAS BP RIGHT ARM: NORMAL MMHG
BH CV XLRA - RV BASE: 3 CM
BH CV XLRA - RV LENGTH: 7.9 CM
BH CV XLRA - RV MID: 2.9 CM
BH CV XLRA - TDI S': 12.2 CM/SEC
LEFT ATRIUM VOLUME INDEX: 12 ML/M2
MAXIMAL PREDICTED HEART RATE: 162 BPM
SINUS: 3.4 CM
STJ: 3.4 CM
STRESS TARGET HR: 138 BPM

## 2020-11-03 NOTE — PROGRESS NOTES
Attempted to notify patient of echocardiogram and stress test results, patient voicemail not set up to receive messages.

## 2020-11-06 ENCOUNTER — TELEPHONE (OUTPATIENT)
Dept: GASTROENTEROLOGY | Facility: CLINIC | Age: 60
End: 2020-11-06

## 2020-11-06 NOTE — TELEPHONE ENCOUNTER
----- Message from Nila Aaron sent at 11/6/2020 10:48 AM EST -----  Regarding: pharmacy called  Contact: [phone]  Khloe from [facility] pharmacy called PT is on medicaid and has to be on tamper resistant also jugs are on back order and wants to know if she can switch to galon bowel prep, please call khloe [phone]

## 2020-11-06 NOTE — TELEPHONE ENCOUNTER
Per verbal order of Dr. Kahn. Patient may use a Miralax prep.  Spoke to Ed [facility], ordered Miralax prep. He repeated and verified order.

## 2020-11-16 ENCOUNTER — LAB (OUTPATIENT)
Dept: LAB | Facility: HOSPITAL | Age: 60
End: 2020-11-16

## 2020-11-16 DIAGNOSIS — Z01.818 OTHER SPECIFIED PRE-OPERATIVE EXAMINATION: ICD-10-CM

## 2020-11-16 PROCEDURE — U0004 COV-19 TEST NON-CDC HGH THRU: HCPCS

## 2020-11-16 PROCEDURE — C9803 HOPD COVID-19 SPEC COLLECT: HCPCS

## 2020-11-17 LAB — SARS-COV-2 RNA RESP QL NAA+PROBE: NOT DETECTED

## 2020-11-18 ENCOUNTER — ANESTHESIA EVENT (OUTPATIENT)
Dept: GASTROENTEROLOGY | Facility: HOSPITAL | Age: 60
End: 2020-11-18

## 2020-11-18 ENCOUNTER — ANESTHESIA (OUTPATIENT)
Dept: GASTROENTEROLOGY | Facility: HOSPITAL | Age: 60
End: 2020-11-18

## 2020-11-18 ENCOUNTER — HOSPITAL ENCOUNTER (OUTPATIENT)
Facility: HOSPITAL | Age: 60
Setting detail: HOSPITAL OUTPATIENT SURGERY
Discharge: HOME | End: 2020-11-18
Attending: INTERNAL MEDICINE | Admitting: INTERNAL MEDICINE

## 2020-11-18 VITALS
OXYGEN SATURATION: 96 % | TEMPERATURE: 98 F | RESPIRATION RATE: 20 BRPM | WEIGHT: 282 LBS | DIASTOLIC BLOOD PRESSURE: 85 MMHG | BODY MASS INDEX: 38.19 KG/M2 | HEART RATE: 74 BPM | SYSTOLIC BLOOD PRESSURE: 124 MMHG | HEIGHT: 72 IN

## 2020-11-18 DIAGNOSIS — Z83.71 FAMILY HISTORY OF POLYPS IN THE COLON: ICD-10-CM

## 2020-11-18 LAB — GLUCOSE BLDC GLUCOMTR-MCNC: 146 MG/DL (ref 70–130)

## 2020-11-18 PROCEDURE — 82962 GLUCOSE BLOOD TEST: CPT

## 2020-11-18 PROCEDURE — 88305 TISSUE EXAM BY PATHOLOGIST: CPT | Performed by: INTERNAL MEDICINE

## 2020-11-18 PROCEDURE — 45385 COLONOSCOPY W/LESION REMOVAL: CPT | Performed by: INTERNAL MEDICINE

## 2020-11-18 PROCEDURE — 25010000002 PROPOFOL 10 MG/ML EMULSION: Performed by: ANESTHESIOLOGY

## 2020-11-18 PROCEDURE — S0260 H&P FOR SURGERY: HCPCS | Performed by: INTERNAL MEDICINE

## 2020-11-18 PROCEDURE — 45380 COLONOSCOPY AND BIOPSY: CPT | Performed by: INTERNAL MEDICINE

## 2020-11-18 RX ORDER — SODIUM CHLORIDE, SODIUM LACTATE, POTASSIUM CHLORIDE, CALCIUM CHLORIDE 600; 310; 30; 20 MG/100ML; MG/100ML; MG/100ML; MG/100ML
30 INJECTION, SOLUTION INTRAVENOUS CONTINUOUS
Status: DISCONTINUED | OUTPATIENT
Start: 2020-11-18 | End: 2020-11-18 | Stop reason: HOSPADM

## 2020-11-18 RX ORDER — SODIUM CHLORIDE, SODIUM LACTATE, POTASSIUM CHLORIDE, CALCIUM CHLORIDE 600; 310; 30; 20 MG/100ML; MG/100ML; MG/100ML; MG/100ML
INJECTION, SOLUTION INTRAVENOUS CONTINUOUS PRN
Status: DISCONTINUED | OUTPATIENT
Start: 2020-11-18 | End: 2020-11-18 | Stop reason: SURG

## 2020-11-18 RX ORDER — PROPOFOL 10 MG/ML
VIAL (ML) INTRAVENOUS CONTINUOUS PRN
Status: DISCONTINUED | OUTPATIENT
Start: 2020-11-18 | End: 2020-11-18 | Stop reason: SURG

## 2020-11-18 RX ORDER — LIDOCAINE HYDROCHLORIDE 20 MG/ML
INJECTION, SOLUTION INFILTRATION; PERINEURAL AS NEEDED
Status: DISCONTINUED | OUTPATIENT
Start: 2020-11-18 | End: 2020-11-18 | Stop reason: SURG

## 2020-11-18 RX ORDER — PROPOFOL 10 MG/ML
VIAL (ML) INTRAVENOUS AS NEEDED
Status: DISCONTINUED | OUTPATIENT
Start: 2020-11-18 | End: 2020-11-18 | Stop reason: SURG

## 2020-11-18 RX ADMIN — LIDOCAINE HYDROCHLORIDE 60 MG: 20 INJECTION, SOLUTION INFILTRATION; PERINEURAL at 09:33

## 2020-11-18 RX ADMIN — SODIUM CHLORIDE, POTASSIUM CHLORIDE, SODIUM LACTATE AND CALCIUM CHLORIDE: 600; 310; 30; 20 INJECTION, SOLUTION INTRAVENOUS at 09:33

## 2020-11-18 RX ADMIN — PROPOFOL 100 MG: 10 INJECTION, EMULSION INTRAVENOUS at 09:33

## 2020-11-18 RX ADMIN — PROPOFOL 160 MCG/KG/MIN: 10 INJECTION, EMULSION INTRAVENOUS at 09:33

## 2020-11-18 RX ADMIN — SODIUM CHLORIDE, POTASSIUM CHLORIDE, SODIUM LACTATE AND CALCIUM CHLORIDE 30 ML/HR: 600; 310; 30; 20 INJECTION, SOLUTION INTRAVENOUS at 09:13

## 2020-11-18 NOTE — ANESTHESIA PREPROCEDURE EVALUATION
Anesthesia Evaluation     Patient summary reviewed and Nursing notes reviewed   NPO Solid Status: > 8 hours  NPO Liquid Status: > 2 hours           Airway   Mallampati: II  TM distance: >3 FB  Neck ROM: full  no difficulty expected  Dental - normal exam     Pulmonary - normal exam   (+) a smoker Current,   (-) decreased breath sounds, wheezes  Cardiovascular - normal exam  Exercise tolerance: good (4-7 METS)    (+) hyperlipidemia,   (-) hypertension      Neuro/Psych- negative ROS  (-) seizures, CVA  GI/Hepatic/Renal/Endo    (+) obesity,   liver disease fatty liver disease, diabetes mellitus well controlled,     Musculoskeletal (-) negative ROS    Abdominal  - normal exam   Substance History - negative use  (-) alcohol use, drug use     OB/GYN negative ob/gyn ROS         Other - negative ROS                     Anesthesia Plan    ASA 3     MAC     intravenous induction       Plan discussed with CRNA.

## 2020-11-18 NOTE — ANESTHESIA POSTPROCEDURE EVALUATION
"Patient: Isiah Saldana    Procedure Summary     Date: 11/18/20 Room / Location: [facility] ENDOSCOPY 4 /  [facility]    Anesthesia Start: 0933 Anesthesia Stop: 1012    Procedure: COLONOSCOPY to cecum and ti with cold snare polypectomies and cold bx polypectomies (N/A ) Diagnosis:       Family history of polyps in the colon      (Family history of polyps in the colon [Z83.71])    Surgeon: Twila Kahn MD Provider: Juliocesar Cadena MD    Anesthesia Type: MAC ASA Status: 3          Anesthesia Type: MAC    Vitals  Vitals Value Taken Time   BP 98/66 11/18/20 1008   Temp     Pulse 73 11/18/20 1008   Resp 16 11/18/20 1008   SpO2 97 % 11/18/20 1008           Post Anesthesia Care and Evaluation    Patient location during evaluation: bedside  Patient participation: complete - patient participated  Level of consciousness: awake and alert  Pain management: adequate  Airway patency: patent  Anesthetic complications: No anesthetic complications    Cardiovascular status: acceptable  Respiratory status: acceptable  Hydration status: acceptable    Comments: BP 98/66 (BP Location: Left arm, Patient Position: Lying)   Pulse 73   Temp 36.7 °C (98 °F) (Oral)   Resp 16   Ht 182.9 cm (72\")   Wt 128 kg (282 lb)   SpO2 97%   BMI 38.25 kg/m²       "

## 2020-11-18 NOTE — H&P
[facility]  Pre Procedure History & Physical    Chief Complaint: Colon cancer screening      HPI: 60-year-old male with PMH as below here for screening colonoscopy.  No family history of GI malignancies, he does report family members with colon polyps.  Denies blood in stool, change in bowel habits, abdominal pain.  Otherwise feels well.        Past Medical History:   Past Medical History:   Diagnosis Date   • Arthritis    • Ascending aorta dilatation (CMS/HCC)    • Coronary artery calcification    • Diabetes mellitus (CMS/HCC)    • Diverticulosis    • Fatty liver    • Low folic acid    • Low serum vitamin B12    • Lung nodule seen on imaging study    • Lymphadenopathy    • Mixed hyperlipidemia    • Onychomycosis    • Tinea pedis of both feet    • Tobacco abuse    • Vitamin D deficiency        Family History:  Family History   Problem Relation Age of Onset   • Hypertension Mother    • Mental illness Father    • Osteoarthritis Sister    • Cancer Sister    • Obesity Sister    • Hypertension Sister    • Obesity Brother    • Diabetes Maternal Grandfather    • Diabetes Cousin    • Malig Hyperthermia Neg Hx        Social History:   reports that he has been smoking. He has a 40.00 pack-year smoking history. He has never used smokeless tobacco. He reports previous alcohol use. He reports that he does not use drugs.    Medications:   No medications prior to admission.       Allergies:  Patient has no known allergies.    ROS:    Pertinent items are noted in HPI     Objective     There were no vitals taken for this visit.    Physical Exam   Constitutional: Pt is oriented to person, place, and time and well-developed, well-nourished, and in no distress.   HENT:   Mouth/Throat: Oropharynx is clear and moist.   Neck: Normal range of motion. Neck supple.   Cardiovascular: Normal rate, regular rhythm and normal heart sounds.    Pulmonary/Chest: Effort normal and breath sounds normal. No respiratory  distress. No  wheezes.   Abdominal: Soft. Bowel sounds are normal.   Skin: Skin is warm and dry.   Psychiatric: Mood, memory, affect and judgment normal.     Assessment/Plan     Diagnosis: Colon cancer screening      Anticipated Surgical Procedure:    Colonoscopy    The risks, benefits, and alternatives of this procedure have been discussed with the patient or the responsible party- the patient understands and agrees to proceed.

## 2020-11-18 NOTE — DISCHARGE INSTRUCTIONS
For the next 24 hours patient needs to be with a responsible adult.    For 24 hours DO NOT drive, operate machinery, appliances, drink alcohol, make important decisions or sign legal documents.    Start with a light or bland diet if you are feeling sick to your stomach otherwise advance to regular diet as tolerated.    Follow recommendations on procedure report if provided by your doctor.    Call Dr Kahn for problems . Problems may include but not limited to: large amounts of bleeding, trouble breathing, repeated vomiting, severe unrelieved pain, fever or chills.

## 2020-11-19 ENCOUNTER — OFFICE VISIT (OUTPATIENT)
Dept: FAMILY MEDICINE CLINIC | Facility: CLINIC | Age: 60
End: 2020-11-19

## 2020-11-19 VITALS
SYSTOLIC BLOOD PRESSURE: 100 MMHG | WEIGHT: 282.6 LBS | BODY MASS INDEX: 38.28 KG/M2 | RESPIRATION RATE: 16 BRPM | HEART RATE: 98 BPM | DIASTOLIC BLOOD PRESSURE: 80 MMHG | HEIGHT: 72 IN | TEMPERATURE: 97.1 F | OXYGEN SATURATION: 98 %

## 2020-11-19 DIAGNOSIS — E11.9 TYPE 2 DIABETES MELLITUS WITHOUT COMPLICATION, WITHOUT LONG-TERM CURRENT USE OF INSULIN (HCC): Primary | ICD-10-CM

## 2020-11-19 DIAGNOSIS — K76.0 FATTY LIVER: ICD-10-CM

## 2020-11-19 DIAGNOSIS — E53.8 LOW FOLIC ACID: ICD-10-CM

## 2020-11-19 DIAGNOSIS — E55.9 VITAMIN D DEFICIENCY: ICD-10-CM

## 2020-11-19 DIAGNOSIS — R91.1 LUNG NODULE SEEN ON IMAGING STUDY: ICD-10-CM

## 2020-11-19 DIAGNOSIS — Z72.0 TOBACCO ABUSE: ICD-10-CM

## 2020-11-19 DIAGNOSIS — I77.810 ASCENDING AORTA DILATATION (HCC): ICD-10-CM

## 2020-11-19 DIAGNOSIS — E78.2 HYPERLIPIDEMIA, MIXED: ICD-10-CM

## 2020-11-19 DIAGNOSIS — E53.8 LOW SERUM VITAMIN B12: ICD-10-CM

## 2020-11-19 DIAGNOSIS — R59.1 LYMPHADENOPATHY: ICD-10-CM

## 2020-11-19 LAB
LAB AP CASE REPORT: NORMAL
PATH REPORT.FINAL DX SPEC: NORMAL
PATH REPORT.GROSS SPEC: NORMAL

## 2020-11-19 PROCEDURE — 99213 OFFICE O/P EST LOW 20 MIN: CPT | Performed by: PHYSICIAN ASSISTANT

## 2020-11-19 RX ORDER — ATORVASTATIN CALCIUM 40 MG/1
40 TABLET, FILM COATED ORAL DAILY
Qty: 90 TABLET | Refills: 3 | Status: SHIPPED | OUTPATIENT
Start: 2020-11-19 | End: 2022-04-06 | Stop reason: SDUPTHER

## 2020-11-19 RX ORDER — METFORMIN HYDROCHLORIDE 500 MG/1
TABLET, EXTENDED RELEASE ORAL
Qty: 360 TABLET | Refills: 0 | Status: SHIPPED | OUTPATIENT
Start: 2020-11-19 | End: 2022-04-06 | Stop reason: SDUPTHER

## 2020-11-19 NOTE — PROGRESS NOTES
"Subjective   Isiah Saldana is a 60 y.o. male.     History of Present Illness    Since the last visit, he has overall felt well.  He has New onset type 2 diabetes and vitamin D deficiency.  Also mixed hyperlipidemia to start a statin.  Labs are due in December for follow-up.  He is taking the B12, folic acid, vitamin D  he has been compliant with current medications have reviewed them.  The patient denies medication side effects.  Will refill medications. /80 (BP Location: Left arm, Patient Position: Sitting, Cuff Size: Adult)   Pulse 98   Temp 97.1 °F (36.2 °C)   Resp 16   Ht 182.9 cm (72.01\")   Wt 128 kg (282 lb 9.6 oz)   SpO2 98%   BMI 38.32 kg/m²   Patient has lost weight and is working on his diet.  He remains physically active.  He did see cardiology work-up with echo in myocardial perfusion stress test negative.  Results for orders placed or performed during the hospital encounter of 11/18/20   POC Glucose Once    Specimen: Blood   Result Value Ref Range    Glucose 146 (H) 70 - 130 mg/dL   Tissue Pathology Exam    Specimen: A: Large Intestine, Cecum; Polyp    B: Large Intestine, Left / Descending Colon; Polyp    C: Large Intestine, Sigmoid Colon; Polyp    D: Large Intestine, Rectum; Polyp   Result Value Ref Range    Case Report       Surgical Pathology Report                         Case: [id number]                                  Authorizing Provider:  Twila Kahn MD        Collected:           11/18/2020 09:45 AM          Ordering Location:     [facility]  Received:            11/18/2020 12:54 PM                                 ENDO SUITES                                                                  Pathologist:           Isaiah Vences MD                                                          Specimens:   1) - Large Intestine, Cecum, Cecal Polyp                                                            2) - Large Intestine, Left / Descending Colon, Descending " Colon Polyp                               3) - Large Intestine, Sigmoid Colon, Sigmoid Colon polyps                                           4) - Large Intestine, Rectum, Rectal Polyp                                                 Final Diagnosis       1. Colon, Cecum, Biopsy: Colonic mucosa with   A. Fragments of tubular adenoma.    2. Colon, Descending, Biopsy: Colonic mucosa with   A. Tubular adenoma.    3. Colon, Sigmoid, Biopsy: Colonic mucosa with   A. Fragments of hyperplastic polyp.    4. Colon, Rectum, Biopsy: Colonic mucosa with   A. Irritated hyperplastic polyp.    [facility]       Gross Description       1.The specimen is received in formalin labeled with the patient's name and further designated 'cecal polyp biopsy' are multiple small fragments of gray-tan tissue. The specimen is submitted for embedding as received.    2.The specimen is received in formalin labeled with the patient's name and further designated 'descending colon polyp biopsy' is a small fragment of gray-tan tissue. The specimen is submitted for embedding as received.    3.The specimen is received in formalin labeled with the patient's name and further designated 'sigmoid colon polyps biopsy' are multiple small fragments of gray-tan tissue. The specimen is submitted for embedding as received.    4.The specimen is received in formalin labeled with the patient's name and further designated 'rectal polyp biopsy' is a small fragment of gray-tan tissue. The specimen is submitted for embedding as received.           Had colonoscopy yesterday for screening    Had stress test and echo---I was concerned about CAD d/t DMII and ascending aorta is mildly dilated, measuring  approximately 3.8 cm  Has f/u CT abd and chest d/t concern of lymph nodes  Also pulm nodules noted  Smoker    The following portions of the patient's history were reviewed and updated as appropriate: allergies, current medications, past family history, past medical history, past  social history, past surgical history and problem list.    Review of Systems   Constitutional: Negative for activity change, appetite change, fatigue and unexpected weight change.   HENT: Negative for nosebleeds and trouble swallowing.    Eyes: Negative for pain and visual disturbance.   Respiratory: Negative for chest tightness, shortness of breath and wheezing.    Cardiovascular: Negative for chest pain and palpitations.   Gastrointestinal: Negative for abdominal pain and blood in stool.   Endocrine: Negative.    Genitourinary: Negative for difficulty urinating and hematuria.   Musculoskeletal: Positive for arthralgias. Negative for joint swelling.   Skin: Negative for color change and rash.   Allergic/Immunologic: Negative.    Neurological: Negative for syncope and speech difficulty.   Hematological: Negative for adenopathy.   Psychiatric/Behavioral: Negative for agitation, confusion, dysphoric mood and sleep disturbance. The patient is not nervous/anxious.    All other systems reviewed and are negative.      Objective   Physical Exam  Vitals signs and nursing note reviewed.   Constitutional:       General: He is not in acute distress.     Appearance: Normal appearance. He is well-developed. He is obese. He is not ill-appearing, toxic-appearing or diaphoretic.   HENT:      Head: Normocephalic.      Right Ear: External ear normal.      Left Ear: External ear normal.   Eyes:      General: No scleral icterus.     Conjunctiva/sclera: Conjunctivae normal.      Pupils: Pupils are equal, round, and reactive to light.   Neck:      Musculoskeletal: Normal range of motion and neck supple.      Vascular: No carotid bruit.   Cardiovascular:      Rate and Rhythm: Normal rate and regular rhythm.      Pulses: Normal pulses.      Heart sounds: Normal heart sounds. No murmur.   Pulmonary:      Effort: Pulmonary effort is normal. No respiratory distress.      Breath sounds: Normal breath sounds. No rhonchi.   Musculoskeletal:  Normal range of motion.         General: No deformity.      Right lower leg: Edema present.      Left lower leg: Edema present.   Skin:     General: Skin is warm and dry.      Coloration: Skin is not jaundiced.   Neurological:      General: No focal deficit present.      Mental Status: He is alert and oriented to person, place, and time. Mental status is at baseline.      Gait: Gait normal.   Psychiatric:         Mood and Affect: Mood normal. Affect is not inappropriate.         Behavior: Behavior normal.         Thought Content: Thought content normal.         Judgment: Judgment normal.         Assessment/Plan   Diagnoses and all orders for this visit:    1. Type 2 diabetes mellitus without complication, without long-term current use of insulin (CMS/HCC) (Primary)    2. Vitamin D deficiency    3. Fatty liver    4. Lung nodule seen on imaging study    5. Low folic acid    6. Ascending aorta dilatation (CMS/HCC)    7. Tobacco abuse    8. Low serum vitamin B12    9. Hyperlipidemia, mixed    10. Lymphadenopathy  -     CT Chest With Contrast; Future    Other orders  -     atorvastatin (Lipitor) 40 MG tablet; Take 1 tablet by mouth Daily. For cholesterol  Dispense: 90 tablet; Refill: 3  -     metFORMIN ER (GLUCOPHAGE-XR) 500 MG 24 hr tablet; 4 PO daily for DMII with food  Dispense: 360 tablet; Refill: 0          Labs due end of Dec  Stop smoking  Plan, Isiah Saldana, was seen today.  he was seen for DMII and refilled medications, Hyperlipidemia and will continue current medication and Vitamin D deficiency and will update labs .    Cont folic acid and B12  CT scan f/u--Feb--chest and abd/pelvis         Answers for HPI/ROS submitted by the patient on 11/18/2020   What is the primary reason for your visit?: Other  Please describe your symptoms.: Follow up appointment  Have you had these symptoms before?: No  How long have you been having these symptoms?: 1-4 days  Please list any medications you are currently taking for  this condition.: On file

## 2020-11-19 NOTE — PATIENT INSTRUCTIONS

## 2020-11-20 NOTE — PROGRESS NOTES
The colon polyps were a mix of adenomas and hyperplastic polyps.    His next colonoscopy should be in 5 years, pls place in recall, thx

## 2020-12-04 ENCOUNTER — TELEPHONE (OUTPATIENT)
Dept: GASTROENTEROLOGY | Facility: CLINIC | Age: 60
End: 2020-12-04

## 2020-12-17 ENCOUNTER — TELEPHONE (OUTPATIENT)
Dept: GASTROENTEROLOGY | Facility: CLINIC | Age: 60
End: 2020-12-17

## 2020-12-17 NOTE — TELEPHONE ENCOUNTER
----- Message from Twila Kahn MD sent at 11/20/2020 12:21 PM EST -----  The colon polyps were a mix of adenomas and hyperplastic polyps.    His next colonoscopy should be in 5 years, pls place in [facility], x

## 2020-12-17 NOTE — TELEPHONE ENCOUNTER
Called pt and advised of Dr Kahn note. Verb understanding.     C/s placed in recall for 11/18/2025.

## 2020-12-25 ENCOUNTER — RESULTS ENCOUNTER (OUTPATIENT)
Dept: FAMILY MEDICINE CLINIC | Facility: CLINIC | Age: 60
End: 2020-12-25

## 2020-12-25 DIAGNOSIS — I25.84 CORONARY ARTERY CALCIFICATION: ICD-10-CM

## 2020-12-25 DIAGNOSIS — Z72.0 TOBACCO ABUSE: ICD-10-CM

## 2020-12-25 DIAGNOSIS — B35.1 ONYCHOMYCOSIS: ICD-10-CM

## 2020-12-25 DIAGNOSIS — E53.8 LOW FOLIC ACID: ICD-10-CM

## 2020-12-25 DIAGNOSIS — R91.1 LUNG NODULE SEEN ON IMAGING STUDY: ICD-10-CM

## 2020-12-25 DIAGNOSIS — R91.8 ABNORMAL CT LUNG SCREENING: ICD-10-CM

## 2020-12-25 DIAGNOSIS — K76.0 FATTY LIVER: ICD-10-CM

## 2020-12-25 DIAGNOSIS — R94.6 BORDERLINE ABNORMAL THYROID FUNCTION TEST: ICD-10-CM

## 2020-12-25 DIAGNOSIS — I25.10 CORONARY ARTERY CALCIFICATION: ICD-10-CM

## 2020-12-25 DIAGNOSIS — K57.90 DIVERTICULOSIS: ICD-10-CM

## 2020-12-25 DIAGNOSIS — I77.810 ASCENDING AORTA DILATATION (HCC): ICD-10-CM

## 2020-12-25 DIAGNOSIS — E53.8 LOW SERUM VITAMIN B12: ICD-10-CM

## 2020-12-25 DIAGNOSIS — E55.9 VITAMIN D DEFICIENCY: ICD-10-CM

## 2020-12-25 DIAGNOSIS — E78.2 HYPERLIPIDEMIA, MIXED: ICD-10-CM

## 2021-03-26 ENCOUNTER — BULK ORDERING (OUTPATIENT)
Dept: CASE MANAGEMENT | Facility: OTHER | Age: 61
End: 2021-03-26

## 2021-03-26 DIAGNOSIS — Z23 IMMUNIZATION DUE: ICD-10-CM

## 2022-01-09 RX ORDER — METFORMIN HYDROCHLORIDE 500 MG/1
TABLET, EXTENDED RELEASE ORAL
Qty: 360 TABLET | Refills: 0 | OUTPATIENT
Start: 2022-01-09

## 2022-01-09 RX ORDER — ATORVASTATIN CALCIUM 40 MG/1
40 TABLET, FILM COATED ORAL DAILY
Qty: 90 TABLET | Refills: 3 | OUTPATIENT
Start: 2022-01-09

## 2022-04-06 ENCOUNTER — OFFICE VISIT (OUTPATIENT)
Dept: FAMILY MEDICINE CLINIC | Facility: CLINIC | Age: 62
End: 2022-04-06

## 2022-04-06 VITALS
HEIGHT: 72 IN | BODY MASS INDEX: 37.65 KG/M2 | RESPIRATION RATE: 16 BRPM | DIASTOLIC BLOOD PRESSURE: 80 MMHG | WEIGHT: 278 LBS | OXYGEN SATURATION: 98 % | TEMPERATURE: 97.5 F | SYSTOLIC BLOOD PRESSURE: 118 MMHG | HEART RATE: 67 BPM

## 2022-04-06 DIAGNOSIS — Z72.0 TOBACCO ABUSE: ICD-10-CM

## 2022-04-06 DIAGNOSIS — I25.84 CORONARY ARTERY CALCIFICATION: ICD-10-CM

## 2022-04-06 DIAGNOSIS — R93.89 ABNORMAL CT OF THE CHEST: Primary | ICD-10-CM

## 2022-04-06 DIAGNOSIS — E78.2 HYPERLIPIDEMIA, MIXED: ICD-10-CM

## 2022-04-06 DIAGNOSIS — R94.6 BORDERLINE ABNORMAL THYROID FUNCTION TEST: ICD-10-CM

## 2022-04-06 DIAGNOSIS — E53.8 LOW SERUM VITAMIN B12: ICD-10-CM

## 2022-04-06 DIAGNOSIS — R59.1 LYMPHADENOPATHY: ICD-10-CM

## 2022-04-06 DIAGNOSIS — E53.8 LOW FOLIC ACID: ICD-10-CM

## 2022-04-06 DIAGNOSIS — E55.9 VITAMIN D DEFICIENCY: ICD-10-CM

## 2022-04-06 DIAGNOSIS — E11.9 TYPE 2 DIABETES MELLITUS WITHOUT COMPLICATION, WITHOUT LONG-TERM CURRENT USE OF INSULIN: ICD-10-CM

## 2022-04-06 DIAGNOSIS — I25.10 CORONARY ARTERY CALCIFICATION: ICD-10-CM

## 2022-04-06 DIAGNOSIS — Z12.5 SCREENING PSA (PROSTATE SPECIFIC ANTIGEN): ICD-10-CM

## 2022-04-06 PROCEDURE — 99214 OFFICE O/P EST MOD 30 MIN: CPT | Performed by: PHYSICIAN ASSISTANT

## 2022-04-06 RX ORDER — METFORMIN HYDROCHLORIDE 500 MG/1
TABLET, EXTENDED RELEASE ORAL
Qty: 180 TABLET | Refills: 1 | Status: SHIPPED | OUTPATIENT
Start: 2022-04-06 | End: 2023-01-25 | Stop reason: SDUPTHER

## 2022-04-06 RX ORDER — ATORVASTATIN CALCIUM 40 MG/1
40 TABLET, FILM COATED ORAL DAILY
Qty: 90 TABLET | Refills: 3 | Status: SHIPPED | OUTPATIENT
Start: 2022-04-06 | End: 2022-04-07

## 2022-04-06 RX ORDER — SILDENAFIL CITRATE 20 MG/1
TABLET ORAL
Qty: 30 TABLET | Refills: 11 | Status: SHIPPED | OUTPATIENT
Start: 2022-04-06 | End: 2023-01-25

## 2022-04-06 RX ORDER — LANOLIN ALCOHOL/MO/W.PET/CERES
400 CREAM (GRAM) TOPICAL DAILY
COMMUNITY
End: 2022-12-26

## 2022-04-06 NOTE — PROGRESS NOTES
"Subjective   Isiah Saldana is a 61 y.o. male.     History of Present Illness    Since the last visit, he has overall felt well.  He has DMII well controlled on medication and will continue regimen, Hyperlipidemia with goals met with current Rx and Vitamin D deficiency and will update labs for continued management.  he has been compliant with current medications have reviewed them.  The patient denies medication side effects.  Will refill medications. /80 (BP Location: Right arm, Patient Position: Sitting, Cuff Size: Adult)   Pulse 67   Temp 97.5 °F (36.4 °C)   Resp 16   Ht 182.9 cm (72.01\")   Wt 126 kg (278 lb)   SpO2 98%   BMI 37.70 kg/m²    Just moved back again.  Labs ?     Results for orders placed or performed during the hospital encounter of 11/18/20   POC Glucose Once    Specimen: Blood   Result Value Ref Range    Glucose 146 (H) 70 - 130 mg/dL   Tissue Pathology Exam    Specimen: A: Large Intestine, Cecum; Polyp    B: Large Intestine, Left / Descending Colon; Polyp    C: Large Intestine, Sigmoid Colon; Polyp    D: Large Intestine, Rectum; Polyp   Result Value Ref Range    Case Report       Surgical Pathology Report                         Case: [id number]                                  Authorizing Provider:  Twila Kahn MD        Collected:           11/18/2020 09:45 AM          Ordering Location:     [facility]  Received:            11/18/2020 12:54 PM                                 ENDO SUITES                                                                  Pathologist:           Isaiah Vences MD                                                          Specimens:   1) - Large Intestine, Cecum, Cecal Polyp                                                            2) - Large Intestine, Left / Descending Colon, Descending Colon Polyp                               3) - Large Intestine, Sigmoid Colon, Sigmoid Colon polyps                                           4) - " Large Intestine, Rectum, Rectal Polyp                                                 Final Diagnosis       1. Colon, Cecum, Biopsy: Colonic mucosa with   A. Fragments of tubular adenoma.    2. Colon, Descending, Biopsy: Colonic mucosa with   A. Tubular adenoma.    3. Colon, Sigmoid, Biopsy: Colonic mucosa with   A. Fragments of hyperplastic polyp.    4. Colon, Rectum, Biopsy: Colonic mucosa with   A. Irritated hyperplastic polyp.    [facility]       Gross Description       1.The specimen is received in formalin labeled with the patient's name and further designated 'cecal polyp biopsy' are multiple small fragments of gray-tan tissue. The specimen is submitted for embedding as received.    2.The specimen is received in formalin labeled with the patient's name and further designated 'descending colon polyp biopsy' is a small fragment of gray-tan tissue. The specimen is submitted for embedding as received.    3.The specimen is received in formalin labeled with the patient's name and further designated 'sigmoid colon polyps biopsy' are multiple small fragments of gray-tan tissue. The specimen is submitted for embedding as received.    4.The specimen is received in formalin labeled with the patient's name and further designated 'rectal polyp biopsy' is a small fragment of gray-tan tissue. The specimen is submitted for embedding as received.             Had colonoscopy Dr. Kahn 11/18/2020 with follow-up colonoscopy due 11/18/2025  Saw Dr. Bustamante 11/8/2020 and noted his dilated aortic root ordered echo to confirm structure of the heart and aorta normal.  It was less than 4 cm and did not need follow-up.  Calcification noted in the coronary arteries and his EKG was unremarkable and did have treadmill stress test  11-2-10  Stress Findings No ECG evidence of myocardial ischemia.Negative clinical evidence of myocardial ischemia.     Had echo 11-2-20  · Calculated left ventricular EF = 61% Estimated left ventricular EF was in  agreement with the calculated left ventricular EF. Normal left ventricular cavity size and wall thickness noted. All left ventricular wall segments contract normally. Left ventricular diastolic function is consistent with (grade I) impaired relaxation. No evidence of left ventricular thrombus or mass present.  · Aortic root = 3.8 cm Sinus of Valsalva = 3.4 cm No dilation of the ascending aorta is present. Ascending aorta = 3.4 cm The inferior vena cava is normally sized.  · There is no evidence of pericardial effusion. . There is evidence of a fat pad present.    CT abd and pelvis 10-7-20     IMPRESSION:  1. Retrocaval 2.1 cm nodule on the right with a CT number consistent  with fluid. Etiology is nonspecific but may be benign such as some sort  of lymphocele or low-density node.  2. Minimal bilateral inguinal lymphadenopathy.  3. No other significant findings are noted.  4. Repeat CT of the abdomen and pelvis with contrast in approximately 4  months to 6 months suggested.     Need f/u CT still need;  Sister has MEN  And had CT chest low dose 9-29-20  IMPRESSION:  1.  Subcentimeter pulmonary nodules are present measuring up to 0.6 cm.  Lung-RADS category 3S. Follow-up with low-dose chest CT in 6 months is  recommended.  2.  Retrocaval soft tissue nodule is present and incompletely  visualized. Findings raise are most concerning for underlying  retroperitoneal adenopathy and further evaluation with CT abdomen and  pelvis with intravenous contrast is recommended to further characterize.  3.  Borderline enlarged left axillary and mediastinal nodes of  indeterminate clinical significance, particularly in the setting of the  soft tissue nodule incompletely visualized in the upper abdomen. In the  absence of known malignancy, continued attention on follow-up with chest  CT in 3 months is recommended to ensure stability. If CT abdomen and  pelvis raises concern for malignancy, these areas should be more closely  followed  with chest CT in 6-8 weeks versus PET/CT.  4.  Hepatic steatosis  5.  Other findings as above.  6.  CTDI 3.6 mGy. .9 mGycm.     This report was finalized on 9/30/2020 4:10 PM by Dr. Ash Simmons M.D.     Ok Viagra for ED      The following portions of the patient's history were reviewed and updated as appropriate: allergies, current medications, past family history, past medical history, past social history, past surgical history and problem list.    Review of Systems   Constitutional: Negative for activity change, appetite change and unexpected weight change.   HENT: Negative for nosebleeds and trouble swallowing.    Eyes: Negative for pain and visual disturbance.   Respiratory: Negative for chest tightness, shortness of breath and wheezing.    Cardiovascular: Negative for chest pain and palpitations.   Gastrointestinal: Negative for abdominal pain and blood in stool.   Endocrine: Negative.    Genitourinary: Negative for difficulty urinating and hematuria.   Musculoskeletal: Positive for arthralgias. Negative for joint swelling.   Skin: Negative for color change and rash.   Allergic/Immunologic: Negative.    Neurological: Negative for syncope and speech difficulty.   Hematological: Negative for adenopathy.   Psychiatric/Behavioral: Negative for agitation and confusion.   All other systems reviewed and are negative.      Objective   Physical Exam  Vitals and nursing note reviewed.   Constitutional:       General: He is not in acute distress.     Appearance: He is well-developed. He is obese. He is not diaphoretic.   HENT:      Head: Normocephalic.   Eyes:      Conjunctiva/sclera: Conjunctivae normal.      Pupils: Pupils are equal, round, and reactive to light.   Neck:      Vascular: No carotid bruit.   Cardiovascular:      Rate and Rhythm: Normal rate and regular rhythm.      Pulses: Normal pulses.      Heart sounds: Normal heart sounds. No murmur heard.  Pulmonary:      Effort: Pulmonary effort is normal.       Breath sounds: Normal breath sounds.   Musculoskeletal:         General: Normal range of motion.      Cervical back: Normal range of motion and neck supple.      Right lower leg: No edema.      Left lower leg: No edema.   Skin:     General: Skin is warm and dry.      Findings: No rash.   Neurological:      General: No focal deficit present.      Mental Status: He is alert and oriented to person, place, and time.   Psychiatric:         Mood and Affect: Mood normal. Affect is not inappropriate.         Behavior: Behavior normal.         Thought Content: Thought content normal.         Judgment: Judgment normal.         Assessment/Plan   Diagnoses and all orders for this visit:    1. Abnormal CT of the chest (Primary)  Comments:   Retrocaval soft tissue nodule is present and incompletely  visualized. Findings raise are most concerning for underlying  retroperitoneal adenopathy and furthe  Orders:  -     CT Chest With Contrast Diagnostic; Future  -     CT Abdomen Pelvis With Contrast; Future    2. Hyperlipidemia, mixed    3. Coronary artery calcification    4. Tobacco abuse    5. Low folic acid    6. Low serum vitamin B12    7. Type 2 diabetes mellitus without complication, without long-term current use of insulin (HCC)    8. Lymphadenopathy    9. Borderline abnormal thyroid function test    10. Vitamin D deficiency    11. Screening PSA (prostate specific antigen)    Other orders  -     metFORMIN ER (GLUCOPHAGE-XR) 500 MG 24 hr tablet; 2 PO daily for DMII with food  Dispense: 180 tablet; Refill: 1  -     atorvastatin (Lipitor) 40 MG tablet; Take 1 tablet by mouth Daily. For cholesterol  Dispense: 90 tablet; Refill: 3    Plan, Isiah Shana, was seen today.  he was seen for DMII and refilled medications, Hyperlipidemia and will continue current medication, Vitamin D deficiency and will update labs  and f/u on prior CT.  Great weight  Stop smoking.  Plantar fasciitis exercises and heel pads  As noted above need to  reorder that follow-up CT abdomen and pelvis with contrast to follow-up on prior imaging and CT at the chest with contrast to follow-up on prior imaging see above  Ok Viagra for ED  Taking B12 and folic acid over-the-counter check labs     Answers for HPI/ROS submitted by the patient on 4/4/2022  What is the primary reason for your visit?: Other  Please describe your symptoms.: prescription refill  Have you had these symptoms before?: Yes  How long have you been having these symptoms?: Greater than 2 weeks

## 2022-04-07 LAB
25(OH)D3+25(OH)D2 SERPL-MCNC: 30.6 NG/ML (ref 30–100)
ALBUMIN SERPL-MCNC: 4.1 G/DL (ref 3.8–4.9)
ALBUMIN/CREAT UR: 3 MG/G CREAT (ref 0–29)
ALBUMIN/GLOB SERPL: 1.3 {RATIO} (ref 1.2–2.2)
ALP SERPL-CCNC: 113 IU/L (ref 44–121)
ALT SERPL-CCNC: 18 IU/L (ref 0–44)
APPEARANCE UR: CLEAR
AST SERPL-CCNC: 21 IU/L (ref 0–40)
BACTERIA #/AREA URNS HPF: NORMAL /[HPF]
BASOPHILS # BLD AUTO: 0.1 X10E3/UL (ref 0–0.2)
BASOPHILS NFR BLD AUTO: 1 %
BILIRUB SERPL-MCNC: 0.2 MG/DL (ref 0–1.2)
BILIRUB UR QL STRIP: NEGATIVE
BUN SERPL-MCNC: 12 MG/DL (ref 6–24)
BUN/CREAT SERPL: 13 (ref 9–20)
CALCIUM SERPL-MCNC: 9.5 MG/DL (ref 8.7–10.2)
CASTS URNS QL MICRO: NORMAL /LPF
CHLORIDE SERPL-SCNC: 100 MMOL/L (ref 96–106)
CHOLEST SERPL-MCNC: 184 MG/DL (ref 100–199)
CO2 SERPL-SCNC: 25 MMOL/L (ref 20–29)
COLOR UR: YELLOW
CREAT SERPL-MCNC: 0.92 MG/DL (ref 0.76–1.27)
CREAT UR-MCNC: 187.4 MG/DL
EGFRCR SERPLBLD CKD-EPI 2021: 96 ML/MIN/1.73
EOSINOPHIL # BLD AUTO: 0.2 X10E3/UL (ref 0–0.4)
EOSINOPHIL NFR BLD AUTO: 2 %
EPI CELLS #/AREA URNS HPF: NORMAL /HPF (ref 0–10)
ERYTHROCYTE [DISTWIDTH] IN BLOOD BY AUTOMATED COUNT: 12.7 % (ref 11.6–15.4)
FOLATE SERPL-MCNC: 9.7 NG/ML
GLOBULIN SER CALC-MCNC: 3.1 G/DL (ref 1.5–4.5)
GLUCOSE SERPL-MCNC: 118 MG/DL (ref 65–99)
GLUCOSE UR QL STRIP: NEGATIVE
HBA1C MFR BLD: 7 % (ref 4.8–5.6)
HCT VFR BLD AUTO: 48 % (ref 37.5–51)
HDLC SERPL-MCNC: 35 MG/DL
HGB BLD-MCNC: 16 G/DL (ref 13–17.7)
HGB UR QL STRIP: NEGATIVE
IMM GRANULOCYTES # BLD AUTO: 0 X10E3/UL (ref 0–0.1)
IMM GRANULOCYTES NFR BLD AUTO: 0 %
KETONES UR QL STRIP: NEGATIVE
LDLC SERPL CALC-MCNC: 121 MG/DL (ref 0–99)
LEUKOCYTE ESTERASE UR QL STRIP: NEGATIVE
LYMPHOCYTES # BLD AUTO: 4.9 X10E3/UL (ref 0.7–3.1)
LYMPHOCYTES NFR BLD AUTO: 46 %
MCH RBC QN AUTO: 30.5 PG (ref 26.6–33)
MCHC RBC AUTO-ENTMCNC: 33.3 G/DL (ref 31.5–35.7)
MCV RBC AUTO: 92 FL (ref 79–97)
MICRO URNS: NORMAL
MICRO URNS: NORMAL
MICROALBUMIN UR-MCNC: 5.2 UG/ML
MONOCYTES # BLD AUTO: 0.5 X10E3/UL (ref 0.1–0.9)
MONOCYTES NFR BLD AUTO: 5 %
NEUTROPHILS # BLD AUTO: 4.9 X10E3/UL (ref 1.4–7)
NEUTROPHILS NFR BLD AUTO: 46 %
NITRITE UR QL STRIP: NEGATIVE
PH UR STRIP: 6 [PH] (ref 5–7.5)
PLATELET # BLD AUTO: 292 X10E3/UL (ref 150–450)
POTASSIUM SERPL-SCNC: 4.2 MMOL/L (ref 3.5–5.2)
PROT SERPL-MCNC: 7.2 G/DL (ref 6–8.5)
PROT UR QL STRIP: NEGATIVE
PSA SERPL-MCNC: 0.8 NG/ML (ref 0–4)
RBC # BLD AUTO: 5.24 X10E6/UL (ref 4.14–5.8)
RBC #/AREA URNS HPF: NORMAL /HPF (ref 0–2)
SODIUM SERPL-SCNC: 140 MMOL/L (ref 134–144)
SP GR UR STRIP: 1.02 (ref 1–1.03)
T3FREE SERPL-MCNC: 3.1 PG/ML (ref 2–4.4)
T4 FREE SERPL-MCNC: 0.99 NG/DL (ref 0.82–1.77)
TRIGL SERPL-MCNC: 156 MG/DL (ref 0–149)
TSH SERPL DL<=0.005 MIU/L-ACNC: 2.9 UIU/ML (ref 0.45–4.5)
UROBILINOGEN UR STRIP-MCNC: 1 MG/DL (ref 0.2–1)
VIT B12 SERPL-MCNC: 504 PG/ML (ref 232–1245)
VLDLC SERPL CALC-MCNC: 28 MG/DL (ref 5–40)
WBC # BLD AUTO: 10.7 X10E3/UL (ref 3.4–10.8)
WBC #/AREA URNS HPF: NORMAL /HPF (ref 0–5)

## 2022-04-07 RX ORDER — ATORVASTATIN CALCIUM 80 MG/1
80 TABLET, FILM COATED ORAL DAILY
Qty: 90 TABLET | Refills: 3 | Status: SHIPPED | OUTPATIENT
Start: 2022-04-07 | End: 2023-01-25 | Stop reason: SDUPTHER

## 2022-06-09 ENCOUNTER — OFFICE VISIT (OUTPATIENT)
Dept: FAMILY MEDICINE CLINIC | Facility: CLINIC | Age: 62
End: 2022-06-09

## 2022-06-09 VITALS
HEART RATE: 76 BPM | OXYGEN SATURATION: 97 % | SYSTOLIC BLOOD PRESSURE: 132 MMHG | WEIGHT: 273 LBS | TEMPERATURE: 98.2 F | DIASTOLIC BLOOD PRESSURE: 78 MMHG | BODY MASS INDEX: 36.98 KG/M2 | RESPIRATION RATE: 16 BRPM | HEIGHT: 72 IN

## 2022-06-09 DIAGNOSIS — E55.9 VITAMIN D DEFICIENCY: ICD-10-CM

## 2022-06-09 DIAGNOSIS — E53.8 LOW FOLIC ACID: ICD-10-CM

## 2022-06-09 DIAGNOSIS — K76.0 FATTY LIVER: ICD-10-CM

## 2022-06-09 DIAGNOSIS — R91.1 LUNG NODULE SEEN ON IMAGING STUDY: ICD-10-CM

## 2022-06-09 DIAGNOSIS — I77.810 ASCENDING AORTA DILATATION: ICD-10-CM

## 2022-06-09 DIAGNOSIS — E11.9 TYPE 2 DIABETES MELLITUS WITHOUT COMPLICATION, WITHOUT LONG-TERM CURRENT USE OF INSULIN: Primary | ICD-10-CM

## 2022-06-09 DIAGNOSIS — I25.84 CORONARY ARTERY CALCIFICATION: ICD-10-CM

## 2022-06-09 DIAGNOSIS — Z72.0 TOBACCO ABUSE: ICD-10-CM

## 2022-06-09 DIAGNOSIS — E78.2 HYPERLIPIDEMIA, MIXED: ICD-10-CM

## 2022-06-09 DIAGNOSIS — E53.8 LOW SERUM VITAMIN B12: ICD-10-CM

## 2022-06-09 DIAGNOSIS — I25.10 CORONARY ARTERY CALCIFICATION: ICD-10-CM

## 2022-06-09 PROCEDURE — 99214 OFFICE O/P EST MOD 30 MIN: CPT | Performed by: PHYSICIAN ASSISTANT

## 2022-06-09 PROCEDURE — 90471 IMMUNIZATION ADMIN: CPT | Performed by: PHYSICIAN ASSISTANT

## 2022-06-09 PROCEDURE — 90677 PCV20 VACCINE IM: CPT | Performed by: PHYSICIAN ASSISTANT

## 2022-06-09 NOTE — PROGRESS NOTES
"Subjective   Isiah Saldana is a 62 y.o. male.     History of Present Illness    Since the last visit, he has overall felt well.  He has DMII well controlled on medication and will continue regimen, Vitamin D deficiency and labs are at goal >30 ng/mL and mixed hyperlipidemia--just raised statin dose---f/u labs Oct.  he has been compliant with current medications have reviewed them.  The patient denies medication side effects.  Will refill medications. /78   Pulse 76   Temp 98.2 °F (36.8 °C) (Oral)   Resp 16   Ht 182.9 cm (72.01\")   Wt 124 kg (273 lb)   SpO2 97%   BMI 37.02 kg/m²     Results for orders placed or performed in visit on 04/06/22   Comprehensive metabolic panel    Specimen: Blood   Result Value Ref Range    Glucose 118 (H) 65 - 99 mg/dL    BUN 12 6 - 24 mg/dL    Creatinine 0.92 0.76 - 1.27 mg/dL    EGFR Result 96 >59 mL/min/1.73    BUN/Creatinine Ratio 13 9 - 20    Sodium 140 134 - 144 mmol/L    Potassium 4.2 3.5 - 5.2 mmol/L    Chloride 100 96 - 106 mmol/L    Total CO2 25 20 - 29 mmol/L    Calcium 9.5 8.7 - 10.2 mg/dL    Total Protein 7.2 6.0 - 8.5 g/dL    Albumin 4.1 3.8 - 4.9 g/dL    Globulin 3.1 1.5 - 4.5 g/dL    A/G Ratio 1.3 1.2 - 2.2    Total Bilirubin 0.2 0.0 - 1.2 mg/dL    Alkaline Phosphatase 113 44 - 121 IU/L    AST (SGOT) 21 0 - 40 IU/L    ALT (SGPT) 18 0 - 44 IU/L   Lipid panel    Specimen: Blood   Result Value Ref Range    Total Cholesterol 184 100 - 199 mg/dL    Triglycerides 156 (H) 0 - 149 mg/dL    HDL Cholesterol 35 (L) >39 mg/dL    VLDL Cholesterol Geovanny 28 5 - 40 mg/dL    LDL Chol Calc (NIH) 121 (H) 0 - 99 mg/dL   TSH    Specimen: Blood   Result Value Ref Range    TSH 2.900 0.450 - 4.500 uIU/mL   Hemoglobin A1c    Specimen: Blood   Result Value Ref Range    Hemoglobin A1C 7.0 (H) 4.8 - 5.6 %   T3, Free    Specimen: Blood   Result Value Ref Range    T3, Free 3.1 2.0 - 4.4 pg/mL   T4, Free    Specimen: Blood   Result Value Ref Range    Free T4 0.99 0.82 - 1.77 ng/dL "   Vitamin B12    Specimen: Blood   Result Value Ref Range    Vitamin B-12 504 232 - 1,245 pg/mL   Folate    Specimen: Blood   Result Value Ref Range    Folate 9.7 >3.0 ng/mL   Vitamin D 25 Hydroxy    Specimen: Blood   Result Value Ref Range    25 Hydroxy, Vitamin D 30.6 30.0 - 100.0 ng/mL   Microalbumin / Creatinine Urine Ratio - Urine, Clean Catch    Specimen: Urine, Clean Catch   Result Value Ref Range    Creatinine, Urine 187.4 Not Estab. mg/dL    Microalbumin, Urine 5.2 Not Estab. ug/mL    Microalbumin/Creatinine Ratio 3 0 - 29 mg/g creat   PSA Screen    Specimen: Blood   Result Value Ref Range    PSA 0.8 0.0 - 4.0 ng/mL   Microscopic Examination -   Result Value Ref Range    WBC, UA None seen 0 - 5 /hpf    RBC, UA 0-2 0 - 2 /hpf    Epithelial Cells (non renal) None seen 0 - 10 /hpf    Casts None seen None seen /lpf    Bacteria, UA None seen None seen/Few   CBC and Differential    Specimen: Blood   Result Value Ref Range    WBC 10.7 3.4 - 10.8 x10E3/uL    RBC 5.24 4.14 - 5.80 x10E6/uL    Hemoglobin 16.0 13.0 - 17.7 g/dL    Hematocrit 48.0 37.5 - 51.0 %    MCV 92 79 - 97 fL    MCH 30.5 26.6 - 33.0 pg    MCHC 33.3 31.5 - 35.7 g/dL    RDW 12.7 11.6 - 15.4 %    Platelets 292 150 - 450 x10E3/uL    Neutrophil Rel % 46 Not Estab. %    Lymphocyte Rel % 46 Not Estab. %    Monocyte Rel % 5 Not Estab. %    Eosinophil Rel % 2 Not Estab. %    Basophil Rel % 1 Not Estab. %    Neutrophils Absolute 4.9 1.4 - 7.0 x10E3/uL    Lymphocytes Absolute 4.9 (H) 0.7 - 3.1 x10E3/uL    Monocytes Absolute 0.5 0.1 - 0.9 x10E3/uL    Eosinophils Absolute 0.2 0.0 - 0.4 x10E3/uL    Basophils Absolute 0.1 0.0 - 0.2 x10E3/uL    Immature Granulocyte Rel % 0 Not Estab. %    Immature Grans Absolute 0.0 0.0 - 0.1 x10E3/uL   Urinalysis With Microscopic - Urine, Clean Catch    Specimen: Urine, Clean Catch   Result Value Ref Range    Specific Gravity, UA 1.019 1.005 - 1.030    pH, UA 6.0 5.0 - 7.5    Color, UA Yellow Yellow    Appearance, UA Clear Clear     Leukocytes, UA Negative Negative    Protein Negative Negative/Trace    Glucose, UA Negative Negative    Ketones Negative Negative    Blood, UA Negative Negative    Bilirubin, UA Negative Negative    Urobilinogen, UA 1.0 0.2 - 1.0 mg/dL    Nitrite, UA Negative Negative    Microscopic Examination Comment     Microscopic Examination See below:          Weight improved      A1c is improved at 7.0 and continue Metformin.  LDL cholesterol goal is to be less than 70 and need to raise dose of atorvastatin to 80 mg.  No protein in urine.  Lymphocyte count slightly above range and will need to follow-up on this with labs in 6 months to follow-up on the above.  Stop smoking.  Other labs [id number]  Saw Dr. Bustamante 11/8/2020 and noted his dilated aortic root ordered echo to confirm structure of the heart and aorta normal.  It was less than 4 cm and did not need follow-up.  Calcification noted in the coronary arteries and his EKG was unremarkable and did have treadmill stress test  11-2-10  Stress Findings No ECG evidence of myocardial ischemia.Negative clinical evidence of myocardial ischemia.      Had echo 11-2-20  · Calculated left ventricular EF = 61% Estimated left ventricular EF was in agreement with the calculated left ventricular EF. Normal left ventricular cavity size and wall thickness noted. All left ventricular wall segments contract normally. Left ventricular diastolic function is consistent with (grade I) impaired relaxation. No evidence of left ventricular thrombus or mass present.  · Aortic root = 3.8 cm Sinus of Valsalva = 3.4 cm No dilation of the ascending aorta is present. Ascending aorta = 3.4 cm The inferior vena cava is normally sized.  · There is no evidence of pericardial effusion. . There is evidence of a fat pad present.   need f/u cardio---per his last note 10-6-2020    CT abd and pelvis 10-7-20     IMPRESSION:  1. Retrocaval 2.1 cm nodule on the right with a CT number consistent  with fluid. Etiology is  nonspecific but may be benign such as some sort  of lymphocele or low-density node.  2. Minimal bilateral inguinal lymphadenopathy.  3. No other significant findings are noted.  4. Repeat CT of the abdomen and pelvis with contrast in approximately 4  months to 6 months suggested.     Had colonoscopy Dr. Kahn 11/18/2020 with follow-up colonoscopy due 11/18/2025  Need f/u CT still need;  Sister has MEN  And had CT chest low dose 9-29-20  IMPRESSION:  1.  Subcentimeter pulmonary nodules are present measuring up to 0.6 cm.  Lung-RADS category 3S. Follow-up with low-dose chest CT in 6 months is  recommended.  2.  Retrocaval soft tissue nodule is present and incompletely  visualized. Findings raise are most concerning for underlying  retroperitoneal adenopathy and further evaluation with CT abdomen and  pelvis with intravenous contrast is recommended to further characterize.  3.  Borderline enlarged left axillary and mediastinal nodes of  indeterminate clinical significance, particularly in the setting of the  soft tissue nodule incompletely visualized in the upper abdomen. In the  absence of known malignancy, continued attention on follow-up with chest  CT in 3 months is recommended to ensure stability. If CT abdomen and  pelvis raises concern for malignancy, these areas should be more closely  followed with chest CT in 6-8 weeks versus PET/CT.  4.  Hepatic steatosis  5.  Other findings as above.  6.  CTDI 3.6 mGy. .9 mGycm.     This report was finalized on 9/30/2020 4:10 PM by Dr. Ash Simmons M.D.     Ok Viagra for ED      The following portions of the patient's history were reviewed and updated as appropriate: allergies, current medications, past family history, past medical history, past social history, past surgical history and problem list.    Review of Systems   Constitutional: Negative for activity change, appetite change and unexpected weight change.   HENT: Negative for nosebleeds and trouble  swallowing.    Eyes: Negative for pain and visual disturbance.   Respiratory: Negative for chest tightness, shortness of breath and wheezing.    Cardiovascular: Negative for chest pain and palpitations.   Gastrointestinal: Negative for abdominal pain and blood in stool.   Endocrine: Negative.    Genitourinary: Negative for difficulty urinating and hematuria.   Musculoskeletal: Negative for joint swelling.   Skin: Negative for color change and rash.   Allergic/Immunologic: Negative.    Neurological: Negative for syncope and speech difficulty.   Hematological: Negative for adenopathy.   Psychiatric/Behavioral: Negative for agitation, confusion and dysphoric mood.   All other systems reviewed and are negative.      Objective   Physical Exam  Vitals and nursing note reviewed.   Constitutional:       General: He is not in acute distress.     Appearance: He is well-developed. He is obese. He is not ill-appearing or diaphoretic.   HENT:      Head: Normocephalic.      Right Ear: Tympanic membrane, ear canal and external ear normal.      Left Ear: Tympanic membrane, ear canal and external ear normal.      Nose: Nose normal.      Mouth/Throat:      Pharynx: No posterior oropharyngeal erythema.      Comments: Edentulous   Eyes:      General: No scleral icterus.     Conjunctiva/sclera: Conjunctivae normal.      Pupils: Pupils are equal, round, and reactive to light.   Neck:      Vascular: No carotid bruit.   Cardiovascular:      Rate and Rhythm: Normal rate and regular rhythm.      Pulses: Normal pulses.      Heart sounds: Normal heart sounds. No murmur heard.  Pulmonary:      Effort: Pulmonary effort is normal.      Breath sounds: Normal breath sounds. No rhonchi or rales.   Abdominal:      General: Bowel sounds are normal.      Palpations: Abdomen is soft.      Tenderness: There is no abdominal tenderness. There is no guarding.      Comments: no HSM   Musculoskeletal:         General: Normal range of motion.      Cervical  back: Normal range of motion and neck supple.      Right lower leg: No edema.      Left lower leg: No edema.   Lymphadenopathy:      Cervical: No cervical adenopathy.   Skin:     General: Skin is warm and dry.      Capillary Refill: Capillary refill takes less than 2 seconds.      Coloration: Skin is not jaundiced.      Findings: Rash present.      Comments: Improving tinea pedis   Neurological:      General: No focal deficit present.      Mental Status: He is alert and oriented to person, place, and time.      Cranial Nerves: No cranial nerve deficit.      Sensory: No sensory deficit.      Motor: No weakness.      Coordination: Coordination normal.      Gait: Gait normal.      Deep Tendon Reflexes: Reflexes normal.   Psychiatric:         Mood and Affect: Mood normal. Affect is not inappropriate.         Behavior: Behavior normal.         Thought Content: Thought content normal.         Judgment: Judgment normal.         Assessment & Plan   Diagnoses and all orders for this visit:    1. Type 2 diabetes mellitus without complication, without long-term current use of insulin (HCC) (Primary)  -     Ambulatory Referral to Ophthalmology    2. Tobacco abuse    3. Low folic acid    4. Hyperlipidemia, mixed    5. Coronary artery calcification  -     Ambulatory Referral to Cardiology    6. Low serum vitamin B12    7. Fatty liver    8. Vitamin D deficiency    9. Lung nodule seen on imaging study    10. Ascending aorta dilatation (HCC)  -     Ambulatory Referral to Cardiology    Other orders  -     Pneumococcal Conjugate Vaccine 20-Valent All      Still need CT chest and abd/pelvis--has order  Labs due Oct  Great job on labs and weight  Plan, Isiah Saldana, was seen today.  he was seen for HTN and continue medication, DMII and refilled medications, Hyperlipidemia and will continue current medication and Vitamin D deficiency and supplemented.

## 2022-06-09 NOTE — PROGRESS NOTES
Immunization  Immunization performed in LD by Hayes House MA. Patient tolerated the procedure well without complications.  06/09/22   Hayes House MA

## 2022-12-26 RX ORDER — FOLIC ACID 1 MG/1
TABLET ORAL
Qty: 90 TABLET | Refills: 0 | Status: SHIPPED | OUTPATIENT
Start: 2022-12-26 | End: 2023-01-25 | Stop reason: SDUPTHER

## 2023-01-25 ENCOUNTER — OFFICE VISIT (OUTPATIENT)
Dept: FAMILY MEDICINE CLINIC | Facility: CLINIC | Age: 63
End: 2023-01-25
Payer: COMMERCIAL

## 2023-01-25 VITALS
HEART RATE: 78 BPM | BODY MASS INDEX: 38.6 KG/M2 | HEIGHT: 72 IN | DIASTOLIC BLOOD PRESSURE: 84 MMHG | TEMPERATURE: 97.9 F | OXYGEN SATURATION: 99 % | WEIGHT: 285 LBS | SYSTOLIC BLOOD PRESSURE: 132 MMHG

## 2023-01-25 DIAGNOSIS — E11.9 TYPE 2 DIABETES MELLITUS WITHOUT COMPLICATION, WITHOUT LONG-TERM CURRENT USE OF INSULIN: Primary | ICD-10-CM

## 2023-01-25 DIAGNOSIS — E55.9 VITAMIN D DEFICIENCY: ICD-10-CM

## 2023-01-25 DIAGNOSIS — R35.1 NOCTURIA: ICD-10-CM

## 2023-01-25 DIAGNOSIS — E53.8 LOW FOLIC ACID: ICD-10-CM

## 2023-01-25 DIAGNOSIS — I25.10 CORONARY ARTERY CALCIFICATION: ICD-10-CM

## 2023-01-25 DIAGNOSIS — E53.8 LOW SERUM VITAMIN B12: ICD-10-CM

## 2023-01-25 DIAGNOSIS — Z72.0 TOBACCO ABUSE: ICD-10-CM

## 2023-01-25 DIAGNOSIS — I25.84 CORONARY ARTERY CALCIFICATION: ICD-10-CM

## 2023-01-25 DIAGNOSIS — N52.8 OTHER MALE ERECTILE DYSFUNCTION: ICD-10-CM

## 2023-01-25 DIAGNOSIS — E78.2 HYPERLIPIDEMIA, MIXED: ICD-10-CM

## 2023-01-25 DIAGNOSIS — I77.810 ASCENDING AORTA DILATATION: ICD-10-CM

## 2023-01-25 PROCEDURE — 99214 OFFICE O/P EST MOD 30 MIN: CPT | Performed by: PHYSICIAN ASSISTANT

## 2023-01-25 RX ORDER — METFORMIN HYDROCHLORIDE 500 MG/1
TABLET, EXTENDED RELEASE ORAL
Qty: 180 TABLET | Refills: 1 | Status: SHIPPED | OUTPATIENT
Start: 2023-01-25 | End: 2023-03-17 | Stop reason: SDUPTHER

## 2023-01-25 RX ORDER — SILDENAFIL CITRATE 20 MG/1
TABLET ORAL
Qty: 30 TABLET | Refills: 11 | Status: SHIPPED | OUTPATIENT
Start: 2023-01-25

## 2023-01-25 RX ORDER — ATORVASTATIN CALCIUM 80 MG/1
80 TABLET, FILM COATED ORAL DAILY
Qty: 90 TABLET | Refills: 3 | Status: SHIPPED | OUTPATIENT
Start: 2023-01-25

## 2023-01-25 RX ORDER — FOLIC ACID 1 MG/1
1000 TABLET ORAL DAILY
Qty: 90 TABLET | Refills: 3 | Status: SHIPPED | OUTPATIENT
Start: 2023-01-25

## 2023-01-25 NOTE — PROGRESS NOTES
"Subjective   Isiah Saldana is a 62 y.o. male.     History of Present Illness    Since the last visit, he has overall felt fairly well.  He has Hyperlipidemia with goals met with current Rx, Vitamin D deficiency and labs are at goal >30 ng/mL and Type 2 diabetes A1c was 7.3 goal is to be less than 7 ordering labs again in April.  Noting he is not on any meds for hypertension we will update urine microalbumin.  Also due for follow-up with a cardiologist per his last note from 2020.  he has been compliant with current medications have reviewed them.  The patient denies medication side effects.  Will refill medications. /82   Pulse 78   Temp 97.9 °F (36.6 °C)   Ht 182.9 cm (72.01\")   Wt 129 kg (285 lb)   SpO2 99%   BMI 38.64 kg/m²     Results for orders placed or performed in visit on 10/07/22   Comprehensive metabolic panel    Specimen: Blood   Result Value Ref Range    Glucose 130 (H) 65 - 99 mg/dL    BUN 10 8 - 23 mg/dL    Creatinine 0.85 0.76 - 1.27 mg/dL    EGFR Result 99.5 >60.0 mL/min/1.73    BUN/Creatinine Ratio 11.8 7.0 - 25.0    Sodium 139 136 - 145 mmol/L    Potassium 4.6 3.5 - 5.2 mmol/L    Chloride 102 98 - 107 mmol/L    Total CO2 31.2 (H) 22.0 - 29.0 mmol/L    Calcium 9.8 8.6 - 10.5 mg/dL    Total Protein 6.8 6.0 - 8.5 g/dL    Albumin 3.90 3.50 - 5.20 g/dL    Globulin 2.9 gm/dL    A/G Ratio 1.3 g/dL    Total Bilirubin 0.4 0.0 - 1.2 mg/dL    Alkaline Phosphatase 124 (H) 39 - 117 U/L    AST (SGOT) 15 1 - 40 U/L    ALT (SGPT) 19 1 - 41 U/L   Lipid panel    Specimen: Blood   Result Value Ref Range    Total Cholesterol 126 0 - 200 mg/dL    Triglycerides 138 0 - 150 mg/dL    HDL Cholesterol 32 (L) 40 - 60 mg/dL    VLDL Cholesterol Geovanny 24 5 - 40 mg/dL    LDL Chol Calc (NIH) 70 0 - 100 mg/dL   Hemoglobin A1c    Specimen: Blood   Result Value Ref Range    Hemoglobin A1C 7.30 (H) 4.80 - 5.60 %   CBC & Differential    Specimen: Blood   Result Value Ref Range    WBC 8.87 3.40 - 10.80 10*3/mm3    RBC 5.15 " 4.14 - 5.80 10*6/mm3    Hemoglobin 15.8 13.0 - 17.7 g/dL    Hematocrit 47.1 37.5 - 51.0 %    MCV 91.5 79.0 - 97.0 fL    MCH 30.7 26.6 - 33.0 pg    MCHC 33.5 31.5 - 35.7 g/dL    RDW 12.5 12.3 - 15.4 %    Platelets 274 140 - 450 10*3/mm3    Neutrophil Rel % 47.9 42.7 - 76.0 %    Lymphocyte Rel % 42.7 19.6 - 45.3 %    Monocyte Rel % 6.5 5.0 - 12.0 %    Eosinophil Rel % 1.6 0.3 - 6.2 %    Basophil Rel % 0.7 0.0 - 1.5 %    Neutrophils Absolute 4.25 1.70 - 7.00 10*3/mm3    Lymphocytes Absolute 3.79 (H) 0.70 - 3.10 10*3/mm3    Monocytes Absolute 0.58 0.10 - 0.90 10*3/mm3    Eosinophils Absolute 0.14 0.00 - 0.40 10*3/mm3    Basophils Absolute 0.06 0.00 - 0.20 10*3/mm3    Immature Granulocyte Rel % 0.6 (H) 0.0 - 0.5 %    Immature Grans Absolute 0.05 0.00 - 0.05 10*3/mm3    nRBC 0.0 0.0 - 0.2 /100 WBC      LDL cholesterol is now at goal and continue statin.  Glucose and A1c are higher than last time.  Need to work on diet and exercise.  We will also discuss monitoring your lymphocyte count at your appointment that is due every 6 months   Written by Francy Brewster PA-C on 11/18/2022  7:49 AM EST    Saw Dr. Bustamante 11/8/2020 and noted his dilated aortic root ordered echo to confirm structure of the heart and aorta normal.  It was less than 4 cm and did not need follow-up.  Calcification noted in the coronary arteries and his EKG was unremarkable and did have treadmill stress test  11-2-10  Stress Findings No ECG evidence of myocardial ischemia.Negative clinical evidence of myocardial ischemia.      Had echo 11-2-20  • Calculated left ventricular EF = 61% Estimated left ventricular EF was in agreement with the calculated left ventricular EF. Normal left ventricular cavity size and wall thickness noted. All left ventricular wall segments contract normally. Left ventricular diastolic function is consistent with (grade I) impaired relaxation. No evidence of left ventricular thrombus or mass present.  • Aortic root = 3.8 cm Sinus of  Valsalva = 3.4 cm No dilation of the ascending aorta is present. Ascending aorta = 3.4 cm The inferior vena cava is normally sized.  • There is no evidence of pericardial effusion. . There is evidence of a fat pad present.   need f/u cardio---per his last note 10-6-2020     CT abd and pelvis 10-7-20     IMPRESSION:  1. Retrocaval 2.1 cm nodule on the right with a CT number consistent  with fluid. Etiology is nonspecific but may be benign such as some sort  of lymphocele or low-density node.  2. Minimal bilateral inguinal lymphadenopathy.  3. No other significant findings are noted.  4. Repeat CT of the abdomen and pelvis with contrast in approximately 4  months to 6 months suggested.     Had colonoscopy Dr. Kahn 11/18/2020 with follow-up colonoscopy due 11/18/2025  Need f/u CT still need;  Sister has MEN  And had CT chest low dose 9-29-20  IMPRESSION:  1.  Subcentimeter pulmonary nodules are present measuring up to 0.6 cm.  Lung-RADS category 3S. Follow-up with low-dose chest CT in 6 months is  recommended.  2.  Retrocaval soft tissue nodule is present and incompletely  visualized. Findings raise are most concerning for underlying  retroperitoneal adenopathy and further evaluation with CT abdomen and  pelvis with intravenous contrast is recommended to further characterize.  3.  Borderline enlarged left axillary and mediastinal nodes of  indeterminate clinical significance, particularly in the setting of the  soft tissue nodule incompletely visualized in the upper abdomen. In the  absence of known malignancy, continued attention on follow-up with chest  CT in 3 months is recommended to ensure stability. If CT abdomen and  pelvis raises concern for malignancy, these areas should be more closely  followed with chest CT in 6-8 weeks versus PET/CT.  4.  Hepatic steatosis  5.  Other findings as above.  6.  CTDI 3.6 mGy. .9 mGycm.     This report was finalized on 9/30/2020 4:10 PM by Dr. Ash Simmons M.D.  Devora  the importance of updating the scans and went over results again and concern radiologist had for adenopathy possible malignancy    Ok Viagra for ED    Stop smoking  Also watching lymphocyte count noting stable and will continue to monitor yearly  The following portions of the patient's history were reviewed and updated as appropriate: allergies, current medications, past family history, past medical history, past social history, past surgical history and problem list.    Review of Systems    Objective   Physical Exam    Assessment & Plan   Diagnoses and all orders for this visit:    1. Type 2 diabetes mellitus without complication, without long-term current use of insulin (HCC) (Primary)  -     Comprehensive metabolic panel; Future  -     Lipid panel; Future  -     CBC and Differential; Future  -     TSH; Future  -     Urinalysis With Microscopic - Urine, Clean Catch; Future  -     Microalbumin / Creatinine Urine Ratio - Urine, Clean Catch; Future  -     Vitamin B12; Future  -     Folate; Future  -     Vitamin D,25-Hydroxy; Future  -     T4, Free; Future  -     PSA DIAGNOSTIC; Future    2. Hyperlipidemia, mixed  -     Comprehensive metabolic panel; Future  -     Lipid panel; Future  -     CBC and Differential; Future  -     TSH; Future  -     Urinalysis With Microscopic - Urine, Clean Catch; Future  -     Microalbumin / Creatinine Urine Ratio - Urine, Clean Catch; Future  -     Vitamin B12; Future  -     Folate; Future  -     Vitamin D,25-Hydroxy; Future  -     T4, Free; Future  -     PSA DIAGNOSTIC; Future    3. Vitamin D deficiency  -     Comprehensive metabolic panel; Future  -     Lipid panel; Future  -     CBC and Differential; Future  -     TSH; Future  -     Urinalysis With Microscopic - Urine, Clean Catch; Future  -     Microalbumin / Creatinine Urine Ratio - Urine, Clean Catch; Future  -     Vitamin B12; Future  -     Folate; Future  -     Vitamin D,25-Hydroxy; Future  -     T4, Free; Future  -     PSA  DIAGNOSTIC; Future    4. Tobacco abuse  -     Comprehensive metabolic panel; Future  -     Lipid panel; Future  -     CBC and Differential; Future  -     TSH; Future  -     Urinalysis With Microscopic - Urine, Clean Catch; Future  -     Microalbumin / Creatinine Urine Ratio - Urine, Clean Catch; Future  -     Vitamin B12; Future  -     Folate; Future  -     Vitamin D,25-Hydroxy; Future  -     T4, Free; Future  -     PSA DIAGNOSTIC; Future    5. Low folic acid  -     Comprehensive metabolic panel; Future  -     Lipid panel; Future  -     CBC and Differential; Future  -     TSH; Future  -     Urinalysis With Microscopic - Urine, Clean Catch; Future  -     Microalbumin / Creatinine Urine Ratio - Urine, Clean Catch; Future  -     Vitamin B12; Future  -     Folate; Future  -     Vitamin D,25-Hydroxy; Future  -     T4, Free; Future  -     PSA DIAGNOSTIC; Future    6. Low serum vitamin B12  -     Comprehensive metabolic panel; Future  -     Lipid panel; Future  -     CBC and Differential; Future  -     TSH; Future  -     Urinalysis With Microscopic - Urine, Clean Catch; Future  -     Microalbumin / Creatinine Urine Ratio - Urine, Clean Catch; Future  -     Vitamin B12; Future  -     Folate; Future  -     Vitamin D,25-Hydroxy; Future  -     T4, Free; Future  -     PSA DIAGNOSTIC; Future    7. Coronary artery calcification  -     Comprehensive metabolic panel; Future  -     Lipid panel; Future  -     CBC and Differential; Future  -     TSH; Future  -     Urinalysis With Microscopic - Urine, Clean Catch; Future  -     Microalbumin / Creatinine Urine Ratio - Urine, Clean Catch; Future  -     Vitamin B12; Future  -     Folate; Future  -     Vitamin D,25-Hydroxy; Future  -     T4, Free; Future  -     PSA DIAGNOSTIC; Future    8. Ascending aorta dilatation (HCC)  -     Comprehensive metabolic panel; Future  -     Lipid panel; Future  -     CBC and Differential; Future  -     TSH; Future  -     Urinalysis With Microscopic - Urine,  Clean Catch; Future  -     Microalbumin / Creatinine Urine Ratio - Urine, Clean Catch; Future  -     Vitamin B12; Future  -     Folate; Future  -     Vitamin D,25-Hydroxy; Future  -     T4, Free; Future  -     PSA DIAGNOSTIC; Future    9. Nocturia  -     Ambulatory Referral to Urology  -     Comprehensive metabolic panel; Future  -     Lipid panel; Future  -     CBC and Differential; Future  -     TSH; Future  -     Urinalysis With Microscopic - Urine, Clean Catch; Future  -     Microalbumin / Creatinine Urine Ratio - Urine, Clean Catch; Future  -     Vitamin B12; Future  -     Folate; Future  -     Vitamin D,25-Hydroxy; Future  -     T4, Free; Future  -     PSA DIAGNOSTIC; Future    10. Other male erectile dysfunction  -     Ambulatory Referral to Urology  -     Comprehensive metabolic panel; Future  -     Lipid panel; Future  -     CBC and Differential; Future  -     TSH; Future  -     Urinalysis With Microscopic - Urine, Clean Catch; Future  -     Microalbumin / Creatinine Urine Ratio - Urine, Clean Catch; Future  -     Vitamin B12; Future  -     Folate; Future  -     Vitamin D,25-Hydroxy; Future  -     T4, Free; Future  -     PSA DIAGNOSTIC; Future    Other orders  -     sildenafil (REVATIO) 20 MG tablet; 2-5 tabs PO once daily PRN ED  Dispense: 30 tablet; Refill: 11    ok Viagra PRN  Plan, Isiah Shana, was seen today.  he was seen for DMII and refilled medications, Hyperlipidemia and will continue current medication and Vitamin D deficiency and will update labs .  He will call centralized scheduling to set up the CT I ordered back in April again for the abnormal findings from the CT low-dose chest  On folic acid and B12--taking and labs April  All labs April  Not on ACE/ARB  Still need f/u cardio--- in the CT with contrast I ordered above will show the ascending aorta--- also to see cardiology following up on the coronary artery calcification in his last note

## 2023-01-25 NOTE — PATIENT INSTRUCTIONS
Diabetes Mellitus Basics  Diabetes mellitus, or diabetes, is a long-term (chronic) disease. It occurs when the body does not properly use sugar (glucose) that is released from food after you eat.  Diabetes mellitus may be caused by one or both of these problems:  Your pancreas does not make enough of a hormone called insulin.  Your body does not react in a normal way to the insulin that it makes.  Insulin lets glucose enter cells in your body. This gives you energy. If you have diabetes, glucose cannot get into cells. This causes high blood glucose (hyperglycemia).  How to treat and manage diabetes  You may need to take insulin or other diabetes medicines daily to keep your glucose in balance. If you are prescribed insulin, you will learn how to give yourself insulin by injection. You may need to adjust the amount of insulin you take based on the foods that you eat.  You will need to check your blood glucose levels using a glucose monitor as told by your health care provider. The readings can help determine if you have low or high blood glucose.  Generally, you should have these blood glucose levels:  Before meals (preprandial):  mg/dL (4.4-7.2 mmol/L).  After meals (postprandial): below 180 mg/dL (10 mmol/L).  Hemoglobin A1c (HbA1c) level: less than 7%.  Your health care provider will set treatment goals for you.  Keep all follow-up visits. This is important.  Follow these instructions at home:  Diabetes medicines  Take your diabetes medicines every day as told by your health care provider. List your diabetes medicines here:  Name of medicine: ______________________________  Amount (dose): _______________ Time (a.m./p.m.): _______________ Notes: ___________________________________  Name of medicine: ______________________________  Amount (dose): _______________ Time (a.m./p.m.): _______________ Notes: ___________________________________  Name of medicine: ______________________________  Amount (dose):  _______________ Time (a.m./p.m.): _______________ Notes: ___________________________________  Insulin  If you use insulin, list the types of insulin you use here:  Insulin type: ______________________________  Amount (dose): _______________ Time (a.m./p.m.): _______________Notes: ___________________________________  Insulin type: ______________________________  Amount (dose): _______________ Time (a.m./p.m.): _______________ Notes: ___________________________________  Insulin type: ______________________________  Amount (dose): _______________ Time (a.m./p.m.): _______________ Notes: ___________________________________  Insulin type: ______________________________  Amount (dose): _______________ Time (a.m./p.m.): _______________ Notes: ___________________________________  Insulin type: ______________________________  Amount (dose): _______________ Time (a.m./p.m.): _______________ Notes: ___________________________________  Managing blood glucose  Check your blood glucose levels using a glucose monitor as told by your health care provider.  Write down the times that you check your glucose levels here:  Time: _______________ Notes: ___________________________________  Time: _______________ Notes: ___________________________________  Time: _______________ Notes: ___________________________________  Time: _______________ Notes: ___________________________________  Time: _______________ Notes: ___________________________________  Time: _______________ Notes: ___________________________________    Low blood glucose  Low blood glucose (hypoglycemia) is when glucose is at or below 70 mg/dL (3.9 mmol/L). Symptoms may include:  Feeling:  Hungry.  Sweaty and clammy.  Irritable or easily upset.  Dizzy.  Sleepy.  Having:  A fast heartbeat.  A headache.  A change in your vision.  Numbness around the mouth, lips, or tongue.  Having trouble with:  Moving (coordination).  Sleeping.  Treating low blood glucose  To treat low blood  glucose, eat or drink something containing sugar right away. If you can think clearly and swallow safely, follow the 15:15 rule:  Take 15 grams of a fast-acting carb (carbohydrate), as told by your health care provider.  Some fast-acting carbs are:  Glucose tablets: take 3-4 tablets.  Hard candy: eat 3-5 pieces.  Fruit juice: drink 4 oz (120 mL).  Regular (not diet) soda: drink 4-6 oz (120-180 mL).  Honey or sugar: eat 1 Tbsp (15 mL).  Check your blood glucose levels 15 minutes after you take the carb.  If your glucose is still at or below 70 mg/dL (3.9 mmol/L), take 15 grams of a carb again.  If your glucose does not go above 70 mg/dL (3.9 mmol/L) after 3 tries, get help right away.  After your glucose goes back to normal, eat a meal or a snack within 1 hour.  Treating very low blood glucose  If your glucose is at or below 54 mg/dL (3 mmol/L), you have very low blood glucose (severe hypoglycemia).  This is an emergency. Do not wait to see if the symptoms will go away. Get medical help right away. Call your local emergency services (911 in the [place] Do not drive yourself to the hospital.  Questions to ask your health care provider  Should I talk with a diabetes educator?  What equipment will I need to care for myself at home?  What diabetes medicines do I need? When should I take them?  How often do I need to check my blood glucose levels?  What number can I call if I have questions?  When is my follow-up visit?  Where can I find a support group for people with diabetes?  Where to find more information  American Diabetes Association: www.diabetes.org  Association of Diabetes Care and Education Specialists: www.diabeteseducator.org  Contact a health care provider if:  Your blood glucose is at or above 240 mg/dL (13.3 mmol/L) for 2 days in a row.  You have been sick or have had a fever for 2 days or more, and you are not getting better.  You have any of these problems for more than 6 hours:  You cannot eat or  drink.  You feel nauseous.  You vomit.  You have diarrhea.  Get help right away if:  Your blood glucose is lower than 54 mg/dL (3 mmol/L).  You get confused.  You have trouble thinking clearly.  You have trouble breathing.  These symptoms may represent a serious problem that is an emergency. Do not wait to see if the symptoms will go away. Get medical help right away. Call your local emergency services (911 in the [place] Do not drive yourself to the hospital.  Summary  Diabetes mellitus is a chronic disease that occurs when the body does not properly use sugar (glucose) that is released from food after you eat.  Take insulin and diabetes medicines as told.  Check your blood glucose every day, as often as told.  Keep all follow-up visits. This is important.  This information is not intended to replace advice given to you by your health care provider. Make sure you discuss any questions you have with your health care provider.  Document Revised: 04/20/2021 Document Reviewed: 04/20/2021  [employer] Patient Education © 2022 [employer]

## 2023-01-31 ENCOUNTER — PRIOR AUTHORIZATION (OUTPATIENT)
Dept: FAMILY MEDICINE CLINIC | Facility: CLINIC | Age: 63
End: 2023-01-31
Payer: COMMERCIAL

## 2023-02-18 ENCOUNTER — HOSPITAL ENCOUNTER (OUTPATIENT)
Dept: CT IMAGING | Facility: HOSPITAL | Age: 63
Discharge: HOME | End: 2023-02-18
Admitting: PHYSICIAN ASSISTANT
Payer: COMMERCIAL

## 2023-02-18 DIAGNOSIS — R93.89 ABNORMAL CT OF THE CHEST: ICD-10-CM

## 2023-02-18 LAB — CREAT BLDA-MCNC: 1 MG/DL (ref 0.6–1.3)

## 2023-02-18 PROCEDURE — 74177 CT ABD & PELVIS W/CONTRAST: CPT

## 2023-02-18 PROCEDURE — 25010000002 IOPAMIDOL 61 % SOLUTION: Performed by: PHYSICIAN ASSISTANT

## 2023-02-18 PROCEDURE — 71260 CT THORAX DX C+: CPT

## 2023-02-18 PROCEDURE — 82565 ASSAY OF CREATININE: CPT

## 2023-02-18 RX ADMIN — IOPAMIDOL 85 ML: 612 INJECTION, SOLUTION INTRAVENOUS at 11:33

## 2023-02-20 DIAGNOSIS — K66.9 PERITONEAL LESION: ICD-10-CM

## 2023-02-20 DIAGNOSIS — R91.1 LUNG NODULE: Primary | ICD-10-CM

## 2023-03-12 ENCOUNTER — HOSPITAL ENCOUNTER (OUTPATIENT)
Dept: MRI IMAGING | Facility: HOSPITAL | Age: 63
Discharge: HOME | End: 2023-03-12
Admitting: PHYSICIAN ASSISTANT
Payer: COMMERCIAL

## 2023-03-12 DIAGNOSIS — K66.9 PERITONEAL LESION: ICD-10-CM

## 2023-03-12 PROCEDURE — 0 GADOBENATE DIMEGLUMINE 529 MG/ML SOLUTION: Performed by: PHYSICIAN ASSISTANT

## 2023-03-12 PROCEDURE — 74183 MRI ABD W/O CNTR FLWD CNTR: CPT

## 2023-03-12 PROCEDURE — A9577 INJ MULTIHANCE: HCPCS | Performed by: PHYSICIAN ASSISTANT

## 2023-03-12 RX ADMIN — GADOBENATE DIMEGLUMINE 20 ML: 529 INJECTION, SOLUTION INTRAVENOUS at 11:55

## 2023-03-17 ENCOUNTER — OFFICE VISIT (OUTPATIENT)
Dept: FAMILY MEDICINE CLINIC | Facility: CLINIC | Age: 63
End: 2023-03-17
Payer: COMMERCIAL

## 2023-03-17 VITALS
RESPIRATION RATE: 16 BRPM | SYSTOLIC BLOOD PRESSURE: 126 MMHG | BODY MASS INDEX: 38.47 KG/M2 | OXYGEN SATURATION: 97 % | TEMPERATURE: 97.4 F | HEART RATE: 69 BPM | HEIGHT: 72 IN | DIASTOLIC BLOOD PRESSURE: 80 MMHG | WEIGHT: 284 LBS

## 2023-03-17 DIAGNOSIS — K76.0 FATTY LIVER: ICD-10-CM

## 2023-03-17 DIAGNOSIS — Z72.0 TOBACCO ABUSE: ICD-10-CM

## 2023-03-17 DIAGNOSIS — E11.9 TYPE 2 DIABETES MELLITUS WITHOUT COMPLICATION, WITHOUT LONG-TERM CURRENT USE OF INSULIN: ICD-10-CM

## 2023-03-17 DIAGNOSIS — R19.00 RETROPERITONEAL MASS: Primary | ICD-10-CM

## 2023-03-17 DIAGNOSIS — E78.2 HYPERLIPIDEMIA, MIXED: ICD-10-CM

## 2023-03-17 PROCEDURE — 1159F MED LIST DOCD IN RCRD: CPT | Performed by: PHYSICIAN ASSISTANT

## 2023-03-17 PROCEDURE — 99214 OFFICE O/P EST MOD 30 MIN: CPT | Performed by: PHYSICIAN ASSISTANT

## 2023-03-17 PROCEDURE — 1160F RVW MEDS BY RX/DR IN RCRD: CPT | Performed by: PHYSICIAN ASSISTANT

## 2023-03-17 RX ORDER — METFORMIN HYDROCHLORIDE 500 MG/1
TABLET, EXTENDED RELEASE ORAL
Qty: 270 TABLET | Refills: 1 | Status: SHIPPED | OUTPATIENT
Start: 2023-03-17

## 2023-03-17 NOTE — PROGRESS NOTES
"Subjective   Isiah Saldana is a 62 y.o. male.     History of Present Illness   Isiah Saldana 62 y.o. male /80   Pulse 69   Temp 97.4 °F (36.3 °C)   Resp 16   Ht 182.9 cm (72.01\")   Wt 129 kg (284 lb)   SpO2 97%   BMI 38.51 kg/m²  who presents today for f/u MRI abd.   he has a history of   Patient Active Problem List   Diagnosis   • Hyperlipidemia, mixed   • Tobacco abuse   • Low serum vitamin B12   • Low folic acid   • Vitamin D deficiency   • Onychomycosis   • Lung nodule seen on imaging study   • Borderline abnormal thyroid function test   • Diverticulosis   • Fatty liver   • Ascending aorta dilatation (HCC)   • Coronary artery calcification   • Family history of polyps in the colon   .      Do try metformin XR at 500 mg 3 tabs daily to get A1c less than 7 and do have labs ordered for April  3-12-23  Examination: MRI of the abdomen without and with contrast per protocol     TECHNIQUE: MRI of the abdomen was obtained before and after the  uneventful administration of gadolinium based contrast per protocol     HISTORY: Peritoneal mass     COMPARISON: CT of 02/18/2023     FINDINGS: there is hepatic steatosis without convincing cirrhosis. No  hepatic masses are seen. The portal vein is patent.     Cysts are seen in the kidneys bilaterally. Gallbladder, spleen, adrenal  glands, pancreas, stomach, and visualized large and small bowel and  abdominal vasculature are normal. No enlarged lymph nodes are seen. No  suspicious osseous lesions are demonstrated.     The right retroperitoneal lesion corresponds to a cystic lesion with  thin enhancement of the wall, measuring 2.3 x 1.7 cm on series 3, image  60.     FINDINGS:   1. Hepatic steatosis  2. Right retroperitoneal lesion, most compatible with retroperitoneal  cyst, stable. Consider 3 month follow-up if clinically indicated.     This report was finalized on 3/13/2023 9:06 AM by Dr. Josué Heaton M.D.     I did d/w DR Perez about report and repeat MRI 3 " mos     Saw Dr. Bustamante 11/8/2020 and noted his dilated aortic root ordered echo to confirm structure of the heart and aorta normal.  It was less than 4 cm and did not need follow-up.  Calcification noted in the coronary arteries and his EKG was unremarkable and did have treadmill stress test  11-2-10  Stress Findings No ECG evidence of myocardial ischemia.Negative clinical evidence of myocardial ischemia.      Had echo 11-2-20  • Calculated left ventricular EF = 61% Estimated left ventricular EF was in agreement with the calculated left ventricular EF. Normal left ventricular cavity size and wall thickness noted. All left ventricular wall segments contract normally. Left ventricular diastolic function is consistent with (grade I) impaired relaxation. No evidence of left ventricular thrombus or mass present.  • Aortic root = 3.8 cm Sinus of Valsalva = 3.4 cm No dilation of the ascending aorta is present. Ascending aorta = 3.4 cm The inferior vena cava is normally sized.  • There is no evidence of pericardial effusion. . There is evidence of a fat pad present.   need f/u cardio---per his last note 10-6-2020---has appt 5-23-23       Also watching lymphocyte count noting stable and will continue to monitor yearly  The following portions of the patient's history were reviewed and updated as appropriate: allergies, current medications, past family history, past medical history, past social history, past surgical history and problem list.    Review of Systems   Constitutional: Negative for activity change, appetite change and unexpected weight change.   HENT: Negative for nosebleeds and trouble swallowing.    Eyes: Negative for pain and visual disturbance.   Respiratory: Negative for chest tightness, shortness of breath and wheezing.    Cardiovascular: Negative for chest pain and palpitations.   Gastrointestinal: Negative for abdominal pain and blood in stool.   Endocrine: Negative.    Genitourinary: Negative for  difficulty urinating and hematuria.   Musculoskeletal: Negative for joint swelling.   Skin: Negative for color change and rash.   Allergic/Immunologic: Negative.    Neurological: Negative for syncope and speech difficulty.   Hematological: Negative for adenopathy.   Psychiatric/Behavioral: Negative for agitation and confusion.   All other systems reviewed and are negative.      Objective   Physical Exam  Vitals and nursing note reviewed.   Constitutional:       General: He is not in acute distress.     Appearance: He is well-developed. He is obese. He is not diaphoretic.   HENT:      Head: Normocephalic.   Eyes:      Conjunctiva/sclera: Conjunctivae normal.      Pupils: Pupils are equal, round, and reactive to light.   Neck:      Vascular: No carotid bruit.   Cardiovascular:      Rate and Rhythm: Normal rate and regular rhythm.      Heart sounds: Normal heart sounds. No murmur heard.  Pulmonary:      Effort: Pulmonary effort is normal.      Breath sounds: Normal breath sounds. No rales.   Musculoskeletal:         General: Normal range of motion.      Cervical back: Normal range of motion and neck supple.   Skin:     General: Skin is warm and dry.      Findings: No rash.   Neurological:      Mental Status: He is alert and oriented to person, place, and time.   Psychiatric:         Mood and Affect: Affect is not inappropriate.         Behavior: Behavior normal.         Thought Content: Thought content normal.         Judgment: Judgment normal.         Assessment & Plan   Diagnoses and all orders for this visit:    1. Retroperitoneal mass (Primary)  Comments:  repeat MRI June    2. Fatty liver    3. Hyperlipidemia, mixed    4. Type 2 diabetes mellitus without complication, without long-term current use of insulin (HCC)    5. Tobacco abuse    Other orders  -     metFORMIN ER (GLUCOPHAGE-XR) 500 MG 24 hr tablet; 3 PO daily for DMII with food  Dispense: 270 tablet; Refill: 1      MRI abd f/u ordered 3 mos ---recheck  cyst  On folic acid and B12--taking and labs April  All labs April  Not on ACE/ARB  Still need f/u cardio--- in the CT with contrast I ordered above will show the ascending aorta--- also to see cardiology following up on the coronary artery calcification in his last note  Plan, Isiah Saldana, was seen today.  he was seen for DMII and adjusted/added medication, Hyperlipidemia and will continue current medication and Vitamin D deficiency and supplemented.

## (undated) DEVICE — ADAPT CLN BIOGUARD AIR/H2O DISP

## (undated) DEVICE — SENSR O2 OXIMAX FNGR A/ 18IN NONSTR

## (undated) DEVICE — SINGLE-USE BIOPSY FORCEPS: Brand: RADIAL JAW 4

## (undated) DEVICE — TUBING, SUCTION, 1/4" X 10', STRAIGHT: Brand: MEDLINE

## (undated) DEVICE — CANN O2 ETCO2 FITS ALL CONN CO2 SMPL A/ 7IN DISP LF

## (undated) DEVICE — LN SMPL CO2 SHTRM SD STREAM W/M LUER

## (undated) DEVICE — THE TORRENT IRRIGATION SCOPE CONNECTOR IS USED WITH THE TORRENT IRRIGATION TUBING TO PROVIDE IRRIGATION FLUIDS SUCH AS STERILE WATER DURING GASTROINTESTINAL ENDOSCOPIC PROCEDURES WHEN USED IN CONJUNCTION WITH AN IRRIGATION PUMP (OR ELECTROSURGICAL UNIT).: Brand: TORRENT

## (undated) DEVICE — SNAR POLYP SENSATION STDOVL 27 240 BX40

## (undated) DEVICE — KT ORCA ORCAPOD DISP STRL

## (undated) DEVICE — THE SINGLE USE ETRAP – POLYP TRAP IS USED FOR SUCTION RETRIEVAL OF ENDOSCOPICALLY REMOVED POLYPS.: Brand: ETRAP